# Patient Record
Sex: FEMALE | Race: WHITE | Employment: PART TIME | URBAN - NONMETROPOLITAN AREA
[De-identification: names, ages, dates, MRNs, and addresses within clinical notes are randomized per-mention and may not be internally consistent; named-entity substitution may affect disease eponyms.]

---
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
## 2020-06-26 LAB — TSH SERPL DL<=0.05 MIU/L-ACNC: 0.9 MIU/ML (ref 0.49–4.67)

## 2020-08-15 ENCOUNTER — OFFICE VISIT (OUTPATIENT)
Dept: PRIMARY CARE CLINIC | Age: 20
End: 2020-08-15
Payer: MEDICARE

## 2020-08-15 ENCOUNTER — HOSPITAL ENCOUNTER (OUTPATIENT)
Age: 20
Setting detail: SPECIMEN
Discharge: HOME | End: 2020-08-15
Payer: MEDICARE

## 2020-08-15 VITALS
WEIGHT: 251.8 LBS | HEART RATE: 89 BPM | BODY MASS INDEX: 42.99 KG/M2 | OXYGEN SATURATION: 97 % | TEMPERATURE: 96.1 F | DIASTOLIC BLOOD PRESSURE: 68 MMHG | RESPIRATION RATE: 18 BRPM | HEIGHT: 64 IN | SYSTOLIC BLOOD PRESSURE: 110 MMHG

## 2020-08-15 PROCEDURE — 4004F PT TOBACCO SCREEN RCVD TLK: CPT | Performed by: NURSE PRACTITIONER

## 2020-08-15 PROCEDURE — G8427 DOCREV CUR MEDS BY ELIG CLIN: HCPCS | Performed by: NURSE PRACTITIONER

## 2020-08-15 PROCEDURE — G8419 CALC BMI OUT NRM PARAM NOF/U: HCPCS | Performed by: NURSE PRACTITIONER

## 2020-08-15 PROCEDURE — U0003 INFECTIOUS AGENT DETECTION BY NUCLEIC ACID (DNA OR RNA); SEVERE ACUTE RESPIRATORY SYNDROME CORONAVIRUS 2 (SARS-COV-2) (CORONAVIRUS DISEASE [COVID-19]), AMPLIFIED PROBE TECHNIQUE, MAKING USE OF HIGH THROUGHPUT TECHNOLOGIES AS DESCRIBED BY CMS-2020-01-R: HCPCS

## 2020-08-15 PROCEDURE — 99213 OFFICE O/P EST LOW 20 MIN: CPT | Performed by: NURSE PRACTITIONER

## 2020-08-15 RX ORDER — ALBUTEROL SULFATE 90 UG/1
2 AEROSOL, METERED RESPIRATORY (INHALATION) EVERY 4 HOURS PRN
COMMUNITY
Start: 2020-08-08

## 2020-08-15 RX ORDER — AMOXICILLIN 875 MG/1
875 TABLET, COATED ORAL 2 TIMES DAILY
Qty: 20 TABLET | Refills: 0 | Status: SHIPPED | OUTPATIENT
Start: 2020-08-15 | End: 2020-08-25

## 2020-08-15 ASSESSMENT — PATIENT HEALTH QUESTIONNAIRE - PHQ9
SUM OF ALL RESPONSES TO PHQ QUESTIONS 1-9: 0
2. FEELING DOWN, DEPRESSED OR HOPELESS: 0
1. LITTLE INTEREST OR PLEASURE IN DOING THINGS: 0
SUM OF ALL RESPONSES TO PHQ QUESTIONS 1-9: 0
SUM OF ALL RESPONSES TO PHQ9 QUESTIONS 1 & 2: 0

## 2020-08-15 ASSESSMENT — ENCOUNTER SYMPTOMS
WHEEZING: 0
SORE THROAT: 0
RHINORRHEA: 1
GASTROINTESTINAL NEGATIVE: 1
SINUS PRESSURE: 1
COUGH: 1

## 2020-08-15 NOTE — LETTER
[address] [facility]  [address] [facility] [address]  Phone: [phone]  Fax: [phone]    CHARISMA Zayas CNP        August 15, 2020     Patient: Roberto Pablo   YOB: 2000   Date of Visit: 8/15/2020       To Whom it May Concern:    Susana Pablo was seen in my clinic on 8/15/2020. May return to work with negative Covid-19 test result and improved symptoms. Test result in 5-7 days. If you have any questions or concerns, please don't hesitate to call.     Sincerely,         CHARISMA Zayas CNP

## 2020-08-15 NOTE — PROGRESS NOTES
[facility]             [address] [facility], [address]                        Telephone [phone]             Fax [phone]  2000  CELESTE:[id number]   Date of visit:  8/15/2020    Subjective:    Susana Pablo is a 21 y.o.  female who presents to [facility] today (8/15/2020) for evaluation of:    Chief Complaint   Patient presents with    Cough     runny/stuffy nose,chest congestion,started 1 week ago HX of asthma       Cough   This is a new problem. The current episode started 1 to 4 weeks ago (X 2 weeks). The problem has been gradually worsening. The problem occurs every few minutes. The cough is non-productive. Associated symptoms include nasal congestion, postnasal drip and rhinorrhea. Pertinent negatives include no chest pain, chills, fever, headaches, rash, sore throat or wheezing. Associated symptoms comments: cough worse at night. Nothing aggravates the symptoms. She has tried a beta-agonist inhaler (albuterol inhaler used 3 times in the last week; claritin D) for the symptoms. The treatment provided moderate relief. Her past medical history is significant for asthma. She has the following problem list:  There is no problem list on file for this patient. Current medications are:  Current Outpatient Medications   Medication Sig Dispense Refill    amoxicillin (AMOXIL) 875 MG tablet Take 1 tablet by mouth 2 times daily for 10 days 20 tablet 0    albuterol sulfate HFA (VENTOLIN HFA) 108 (90 Base) MCG/ACT inhaler Inhale 2 puffs into the lungs every 4 hours as needed for Wheezing or Shortness of Breath (cough)      ondansetron (ZOFRAN) 4 MG tablet Take 1 tablet by mouth every 8 hours as needed for Nausea (Patient not taking: Reported on 8/15/2020) 20 tablet 0     No current facility-administered medications for this visit. She has No Known Allergies. .    She  reports that she has been smoking cigarettes. She has a 20.00 pack-year smoking history. She has never used smokeless tobacco.      Objective:    Vitals:    08/15/20 1500   BP: 110/68   Pulse: 89   Resp: 18   Temp: 96.1 °F (35.6 °C)   TempSrc: Tympanic   SpO2: 97%   Weight: 251 lb 12.8 oz (114.2 kg)   Height: 5' 4\" (1.626 m)     Body mass index is 43.22 kg/m². Review of Systems   Constitutional: Negative. Negative for chills and fever. HENT: Positive for congestion, postnasal drip, rhinorrhea and sinus pressure. Negative for sore throat. Respiratory: Positive for cough. Negative for wheezing. Cardiovascular: Negative. Negative for chest pain. Gastrointestinal: Negative. Skin: Negative for rash. Neurological: Negative for headaches. Physical Exam  Vitals signs and nursing note reviewed. Constitutional:       Appearance: She is well-developed. HENT:      Head: Normocephalic. Jaw: There is normal jaw occlusion. Right Ear: Tympanic membrane, ear canal and external ear normal.      Left Ear: Tympanic membrane, ear canal and external ear normal.      Nose: Congestion present. Right Turbinates: Swollen (erythema). Left Turbinates: Swollen (erythema). Right Sinus: Maxillary sinus tenderness present. No frontal sinus tenderness. Left Sinus: Maxillary sinus tenderness present. No frontal sinus tenderness. Mouth/Throat:      Lips: Pink. Mouth: Mucous membranes are moist.      Pharynx: Oropharynx is clear. Uvula midline. Eyes:      Pupils: Pupils are equal, round, and reactive to light. Neck:      Musculoskeletal: Normal range of motion and neck supple. Cardiovascular:      Rate and Rhythm: Normal rate and regular rhythm. Heart sounds: Normal heart sounds. Pulmonary:      Effort: Pulmonary effort is normal.      Breath sounds: Normal breath sounds and air entry. Lymphadenopathy:      Cervical: No cervical adenopathy.    Skin:     General: Skin is warm and dry. Neurological:      Mental Status: She is alert and oriented to person, place, and time. Psychiatric:         Behavior: Behavior normal.         Thought Content: Thought content normal.       Assessment and Plan:    No results found for this visit on 08/15/20. Diagnosis Orders   1. Maxillary sinusitis, unspecified chronicity  amoxicillin (AMOXIL) 875 MG tablet   2. Cough  Covid-19 Ambulatory   3. Upper respiratory tract infection, unspecified type  Covid-19 Ambulatory   4. Person under investigation for COVID-19  Covid-19 Ambulatory     Self quarantine until negative Covid-19 test result and symptoms improving. We will call with Covid-19 test results. Complete full course of antibiotic. Continue Claritin D daily for sinus symptoms. she was also encouraged to use tylenol for pain/fever. Increase water intake. Use cool mist humidifier at bedtime. Use nasal saline flush as needed. Good hand hygiene. she was instructed to return if there is no improvement or symptoms worsen. The use, risks, benefits, and side effects of prescribed or recommended medications were discussed. All questions were answered and the patient/caregiver voiced understanding. No orders of the defined types were placed in this encounter.         Electronically signed by CHARISMA Cruz CNP on 8/15/20 at 3:21 PM EDT

## 2020-08-15 NOTE — PATIENT INSTRUCTIONS
Patient Education        Learning About Coronavirus [phone]  Coronavirus [phone]): Overview  What is coronavirus ([id number]-[id number])? The coronavirus disease (COVID-19) is caused by a virus. It is an illness that was first found in [place], [place], in December 2019. It has since spread worldwide. The virus can cause fever, cough, and trouble breathing. In severe cases, it can cause pneumonia and make it hard to breathe without help. It can cause death. Coronaviruses are a large group of viruses. They cause the common cold. They also cause more serious illnesses like Middle East respiratory syndrome (MERS) and severe acute respiratory syndrome (SARS). COVID-19 is caused by a novel coronavirus. That means it's a new type that has not been seen in people before. This virus spreads person-to-person through droplets from coughing and sneezing. It can also spread when you are close to someone who is infected. And it can spread when you touch something that has the virus on it, such as a doorknob or a tabletop. What can you do to protect yourself from coronavirus (COVID-19)? The best way to protect yourself from getting sick is to:  · Avoid areas where there is an outbreak. · Avoid contact with people who may be infected. · Wash your hands often with soap or alcohol-based hand sanitizers. · Avoid crowds and try to stay at least 6 feet away from other people. · Wash your hands often, especially after you cough or sneeze. Use soap and water, and scrub for at least 20 seconds. If soap and water aren't available, use an alcohol-based hand . · Avoid touching your mouth, nose, and eyes. What can you do to avoid spreading the virus to others? To help avoid spreading the virus to others:  · Cover your mouth with a tissue when you cough or sneeze. Then throw the tissue in the trash. · Use a disinfectant to clean things that you touch often. · Wear a cloth face cover if you have to go to public areas.   · Stay home face and lips have a blue color. · You pass out (lose consciousness) or are very hard to wake up. Call your doctor now or seek immediate medical care if:  · You have moderate trouble breathing. (You can't speak a full sentence.)  · You are coughing up blood (more than about 1 teaspoon). · You have signs of low blood pressure. These include feeling lightheaded; being too weak to stand; and having cold, pale, clammy skin. Watch closely for changes in your health, and be sure to contact your doctor if:  · Your symptoms get worse. · You are not getting better as expected. Call before you go to the doctor's office. Follow their instructions. And wear a cloth face cover. Current as of: May 8, 2020               Content Version: 12.5  © 2006-2020 [employer] Inc[employer]. Care instructions adapted under license by [facility] ([facility]). If you have questions about a medical condition or this instruction, always ask your healthcare professional. [place] [address] any warranty or liability for your use of this information. Patient Education        Sinusitis: Care Instructions  Your Care Instructions        Sinusitis is an infection of the lining of the sinus cavities in your head. Sinusitis often follows a cold. It causes pain and pressure in your head and face. In most cases, sinusitis gets better on its own in 1 to 2 weeks. But some mild symptoms may last for several weeks. Sometimes antibiotics are needed. Follow-up care is a key part of your treatment and safety. Be sure to make and go to all appointments, and call your doctor if you are having problems. It's also a good idea to know your test results and keep a list of the medicines you take. How can you care for yourself at home? · Take an over-the-counter pain medicine, such as acetaminophen (Tylenol), ibuprofen (Advil, Motrin), or naproxen (Aleve). Read and follow all instructions on the label.   · If the doctor prescribed antibiotics, take them as directed. Do not stop taking them just because you feel better. You need to take the full course of antibiotics. · Be careful when taking over-the-counter cold or flu medicines and Tylenol at the same time. Many of these medicines have acetaminophen, which is Tylenol. Read the labels to make sure that you are not taking more than the recommended dose. Too much acetaminophen (Tylenol) can be harmful. · Breathe warm, moist air from a steamy shower, a hot bath, or a sink filled with hot water. Avoid cold, dry air. Using a humidifier in your home may help. Follow the directions for cleaning the machine. · Use saline (saltwater) nasal washes to help keep your nasal passages open and wash out mucus and bacteria. You can buy saline nose drops at a grocery store or drugstore. Or you can make your own at home by adding 1 teaspoon of salt and 1 teaspoon of baking soda to 2 cups of distilled water. If you make your own, fill a bulb syringe with the solution, insert the tip into your nostril, and squeeze gently. Jermaine Neve your nose. · Put a hot, wet towel or a warm gel pack on your face 3 or 4 times a day for 5 to 10 minutes each time. · Try a decongestant nasal spray like oxymetazoline (Afrin). Do not use it for more than 3 days in a row. Using it for more than 3 days can make your congestion worse. When should you call for help? Call your doctor now or seek immediate medical care if:  · You have new or worse swelling or redness in your face or around your eyes. · You have a new or higher fever. Watch closely for changes in your health, and be sure to contact your doctor if:  · You have new or worse facial pain. · The mucus from your nose becomes thicker (like pus) or has new blood in it. · You are not getting better as expected. Where can you learn more? Go to https://carol.[employer]. org and sign in to your [facility] account.  Enter R968 in the "[employer] box to learn more about

## 2020-08-19 LAB — SARS-COV-2, NAA: NOT DETECTED

## 2020-11-09 LAB
ALBUMIN/GLOBULIN RATIO: 1.1 G/DL
ALBUMIN: 3.5 G/DL (ref 3.5–5)
ALP BLD-CCNC: 351 UNITS/L (ref 38–126)
ALT SERPL-CCNC: 23 UNITS/L (ref 4–35)
ANION GAP SERPL CALCULATED.3IONS-SCNC: 12.8 MMOL/L
AST SERPL-CCNC: 22 UNITS/L (ref 14–36)
BASOPHILS %: 0.42 (ref 0–3)
BASOPHILS ABSOLUTE: 0.06 (ref 0–0.3)
BILIRUB SERPL-MCNC: 0.3 MG/DL (ref 0.2–1.3)
BUN BLDV-MCNC: 9 MG/DL (ref 7–17)
CALCIUM SERPL-MCNC: 8.5 MG/DL (ref 8.4–10.2)
CHLORIDE BLD-SCNC: 105 MMOL/L (ref 98–120)
CO2: 21 MMOL/L (ref 22–31)
CREAT SERPL-MCNC: 0.6 MG/DL (ref 0.5–1)
EOSINOPHILS %: 0.59 (ref 0–10)
EOSINOPHILS ABSOLUTE: 0.09 (ref 0–1.1)
FERRITIN: 7.24 NG/DL (ref 10–282)
GFR CALCULATED: > 60
GLOBULIN: 3.3 G/DL
GLUCOSE: 100 MG/DL (ref 65–105)
HCT VFR BLD CALC: 35.5 % (ref 37–47)
HEMOGLOBIN: 11.6 (ref 12–16)
IRON: 130 MG/DL (ref 37–170)
LYMPHOCYTE %: 14.82 (ref 20–51.1)
LYMPHOCYTES ABSOLUTE: 2.2 (ref 1–5.5)
MCH RBC QN AUTO: 28 PG (ref 28.5–32.5)
MCHC RBC AUTO-ENTMCNC: 32.7 G/DL (ref 32–37)
MCV RBC AUTO: 85.8 FL (ref 80–94)
MONOCYTES %: 3.92 (ref 1.7–9.3)
MONOCYTES ABSOLUTE: 0.58 (ref 0.1–1)
NEUTROPHILS %: 80.25 (ref 42.2–75.2)
NEUTROPHILS ABSOLUTE: 11.94 (ref 2–8.1)
PDW BLD-RTO: 11.9 % (ref 8.5–15.5)
PLATELET # BLD: 297 THOU/MM3 (ref 130–400)
POTASSIUM SERPL-SCNC: 3.8 MMOL/L (ref 3.6–5)
RBC: 4.13 M/UL (ref 4.2–5.4)
SODIUM BLD-SCNC: 135 MMOL/L (ref 135–145)
TOTAL IRON BINDING CAPACITY: 488 MG/DL (ref 261–497)
TOTAL PROTEIN, SERUM: 6.7 G/DL (ref 6.3–8.2)
WBC: 14.9 THOU/ML3 (ref 4.8–10.8)

## 2021-07-21 ENCOUNTER — HOSPITAL ENCOUNTER (EMERGENCY)
Age: 21
Discharge: HOME | End: 2021-07-22
Attending: EMERGENCY MEDICINE
Payer: MEDICARE

## 2021-07-21 DIAGNOSIS — R11.2 NON-INTRACTABLE VOMITING WITH NAUSEA, UNSPECIFIED VOMITING TYPE: ICD-10-CM

## 2021-07-21 DIAGNOSIS — R10.84 GENERALIZED ABDOMINAL PAIN: Primary | ICD-10-CM

## 2021-07-21 LAB
-: ABNORMAL
ABSOLUTE EOS #: 0.25 K/UL (ref 0–0.44)
ABSOLUTE IMMATURE GRANULOCYTE: 0.03 K/UL (ref 0–0.3)
ABSOLUTE LYMPH #: 1.78 K/UL (ref 1.1–3.7)
ABSOLUTE MONO #: 0.6 K/UL (ref 0.1–1.4)
ALBUMIN SERPL-MCNC: 4.2 G/DL (ref 3.5–5.2)
ALBUMIN/GLOBULIN RATIO: 1.3 (ref 1–2.5)
ALP BLD-CCNC: 112 U/L (ref 35–104)
ALT SERPL-CCNC: 21 U/L (ref 5–33)
AMORPHOUS: ABNORMAL
ANION GAP SERPL CALCULATED.3IONS-SCNC: 9 MMOL/L (ref 9–17)
AST SERPL-CCNC: 20 U/L
BACTERIA: ABNORMAL
BASOPHILS # BLD: 1 % (ref 0–2)
BASOPHILS ABSOLUTE: 0.05 K/UL (ref 0–0.2)
BILIRUB SERPL-MCNC: 0.21 MG/DL (ref 0.3–1.2)
BILIRUBIN URINE: NEGATIVE
BUN BLDV-MCNC: 9 MG/DL (ref 6–20)
BUN/CREAT BLD: 16 (ref 9–20)
CALCIUM SERPL-MCNC: 9.1 MG/DL (ref 8.6–10.4)
CASTS UA: ABNORMAL /LPF (ref 0–2)
CHLORIDE BLD-SCNC: 108 MMOL/L (ref 98–107)
CO2: 24 MMOL/L (ref 20–31)
COLOR: ABNORMAL
COMMENT UA: ABNORMAL
CREAT SERPL-MCNC: 0.55 MG/DL (ref 0.5–0.9)
CRYSTALS, UA: ABNORMAL /HPF
DIFFERENTIAL TYPE: ABNORMAL
EOSINOPHILS RELATIVE PERCENT: 3 % (ref 1–4)
EPITHELIAL CELLS UA: ABNORMAL /HPF (ref 0–5)
GFR AFRICAN AMERICAN: >60 ML/MIN
GFR NON-AFRICAN AMERICAN: >60 ML/MIN
GFR SERPL CREATININE-BSD FRML MDRD: ABNORMAL ML/MIN/{1.73_M2}
GFR SERPL CREATININE-BSD FRML MDRD: ABNORMAL ML/MIN/{1.73_M2}
GLUCOSE BLD-MCNC: 97 MG/DL (ref 70–99)
GLUCOSE URINE: NEGATIVE
HCG(URINE) PREGNANCY TEST: NEGATIVE
HCT VFR BLD CALC: 37.7 % (ref 36.3–47.1)
HEMOGLOBIN: 11.8 G/DL (ref 11.9–15.1)
IMMATURE GRANULOCYTES: 0 %
KETONES, URINE: NEGATIVE
LEUKOCYTE ESTERASE, URINE: NEGATIVE
LIPASE: 21 U/L (ref 13–60)
LYMPHOCYTES # BLD: 20 % (ref 25–45)
MCH RBC QN AUTO: 26.2 PG (ref 25.2–33.5)
MCHC RBC AUTO-ENTMCNC: 31.3 G/DL (ref 25.2–33.5)
MCV RBC AUTO: 83.8 FL (ref 82.6–102.9)
MONOCYTES # BLD: 7 % (ref 2–8)
MUCUS: ABNORMAL
NITRITE, URINE: NEGATIVE
NRBC AUTOMATED: 0 PER 100 WBC
OTHER OBSERVATIONS UA: ABNORMAL
PDW BLD-RTO: 15.1 % (ref 11.8–14.4)
PH UA: 8.5 (ref 5–6)
PLATELET # BLD: 344 K/UL (ref 138–453)
PLATELET ESTIMATE: ABNORMAL
PMV BLD AUTO: 9.3 FL (ref 8.1–13.5)
POTASSIUM SERPL-SCNC: 3.8 MMOL/L (ref 3.7–5.3)
PROTEIN UA: NEGATIVE
RBC # BLD: 4.5 M/UL (ref 3.95–5.11)
RBC # BLD: ABNORMAL 10*6/UL
RBC UA: ABNORMAL /HPF (ref 0–4)
RENAL EPITHELIAL, UA: ABNORMAL /HPF
SEG NEUTROPHILS: 69 % (ref 34–64)
SEGMENTED NEUTROPHILS ABSOLUTE COUNT: 6.09 K/UL (ref 1.5–8.1)
SODIUM BLD-SCNC: 141 MMOL/L (ref 135–144)
SPECIFIC GRAVITY UA: 1.02 (ref 1.01–1.02)
TOTAL PROTEIN: 7.4 G/DL (ref 6.4–8.3)
TRICHOMONAS: ABNORMAL
TURBIDITY: ABNORMAL
URINE HGB: NEGATIVE
UROBILINOGEN, URINE: NORMAL
WBC # BLD: 8.8 K/UL (ref 4.5–13.5)
WBC # BLD: ABNORMAL 10*3/UL
WBC UA: ABNORMAL /HPF (ref 0–4)
YEAST: ABNORMAL

## 2021-07-21 PROCEDURE — 81025 URINE PREGNANCY TEST: CPT

## 2021-07-21 PROCEDURE — 6370000000 HC RX 637 (ALT 250 FOR IP): Performed by: EMERGENCY MEDICINE

## 2021-07-21 PROCEDURE — 99284 EMERGENCY DEPT VISIT MOD MDM: CPT

## 2021-07-21 PROCEDURE — 85025 COMPLETE CBC W/AUTO DIFF WBC: CPT

## 2021-07-21 PROCEDURE — 36415 COLL VENOUS BLD VENIPUNCTURE: CPT

## 2021-07-21 PROCEDURE — 83690 ASSAY OF LIPASE: CPT

## 2021-07-21 PROCEDURE — 81001 URINALYSIS AUTO W/SCOPE: CPT

## 2021-07-21 PROCEDURE — 80053 COMPREHEN METABOLIC PANEL: CPT

## 2021-07-21 RX ORDER — ONDANSETRON 4 MG/1
4 TABLET, ORALLY DISINTEGRATING ORAL ONCE
Status: COMPLETED | OUTPATIENT
Start: 2021-07-21 | End: 2021-07-21

## 2021-07-21 RX ORDER — DICYCLOMINE HYDROCHLORIDE 10 MG/1
20 CAPSULE ORAL ONCE
Status: COMPLETED | OUTPATIENT
Start: 2021-07-21 | End: 2021-07-21

## 2021-07-21 RX ADMIN — ONDANSETRON 4 MG: 4 TABLET, ORALLY DISINTEGRATING ORAL at 23:11

## 2021-07-21 RX ADMIN — DICYCLOMINE HYDROCHLORIDE 20 MG: 10 CAPSULE ORAL at 23:11

## 2021-07-21 ASSESSMENT — PAIN SCALES - GENERAL: PAINLEVEL_OUTOF10: 7

## 2021-07-21 ASSESSMENT — PAIN DESCRIPTION - PAIN TYPE: TYPE: ACUTE PAIN

## 2021-07-21 ASSESSMENT — PAIN DESCRIPTION - LOCATION: LOCATION: ABDOMEN

## 2021-07-21 ASSESSMENT — PAIN DESCRIPTION - DESCRIPTORS: DESCRIPTORS: PRESSURE

## 2021-07-22 VITALS
HEART RATE: 93 BPM | SYSTOLIC BLOOD PRESSURE: 140 MMHG | OXYGEN SATURATION: 98 % | TEMPERATURE: 99.1 F | BODY MASS INDEX: 41.83 KG/M2 | RESPIRATION RATE: 16 BRPM | WEIGHT: 245 LBS | HEIGHT: 64 IN | DIASTOLIC BLOOD PRESSURE: 82 MMHG

## 2021-07-22 RX ORDER — ONDANSETRON 4 MG/1
4 TABLET, ORALLY DISINTEGRATING ORAL EVERY 8 HOURS PRN
Qty: 12 TABLET | Refills: 0 | Status: SHIPPED | OUTPATIENT
Start: 2021-07-22 | End: 2021-12-03

## 2021-07-22 RX ORDER — DICYCLOMINE HYDROCHLORIDE 10 MG/1
20 CAPSULE ORAL EVERY 6 HOURS PRN
Qty: 30 CAPSULE | Refills: 0 | Status: SHIPPED | OUTPATIENT
Start: 2021-07-22 | End: 2022-10-12

## 2021-07-22 ASSESSMENT — ENCOUNTER SYMPTOMS
ABDOMINAL PAIN: 1
NAUSEA: 1
SHORTNESS OF BREATH: 0
COUGH: 0
VOMITING: 1
BACK PAIN: 0

## 2021-07-22 NOTE — ED PROVIDER NOTES
[address] [facility]  [address]  Phone: [phone]  eMERGENCY dEPARTMENT eNCOUnter      Pt Name: Branden Lane  MRN: [id number]  [place] 2000  Date of evaluation: 7/22/21      CHIEF COMPLAINT     Chief Complaint   Patient presents with    Abdominal Pain     x2-3 days, n/v, x1 episode of emesis today. Pt reports she had hx of gb removed and has IBS. HISTORY OF PRESENT ILLNESS    Susana Pablo is a 24 y.o. female who presents today with complaints of abdominal pain that has been going on for 1 week is what she reports to me. Is waxing and waning. She states that it feels like something is stretching and twisting inside her abdomen. Has not noticed any relationship to food. She rates it as a moderate pain. She had one episode of emesis today. No diarrhea or constipation. Has not noticed any blood in the stool. Prior history of cholecystectomy. She has also had 2 previous C-sections. No fevers. No cough cold or congestion. Is otherwise able to tolerate fluids. No history of diabetes. REVIEW OF SYSTEMS     Review of Systems   Constitutional: Negative for fever. HENT: Negative for congestion. Respiratory: Negative for cough and shortness of breath. Cardiovascular: Negative for chest pain. Gastrointestinal: Positive for abdominal pain, nausea and vomiting. Genitourinary: Negative for dysuria. Musculoskeletal: Negative for back pain. Skin: Negative for rash. Neurological: Negative for syncope. All other systems reviewed and are negative. PAST MEDICAL HISTORY    has no past medical history on file. SURGICAL HISTORY      has a past surgical history that includes Cholecystectomy and Tympanostomy tube placement.     CURRENT MEDICATIONS       Discharge Medication List as of 7/22/2021 12:13 AM      CONTINUE these medications which have NOT CHANGED    Details   albuterol sulfate HFA (VENTOLIN HFA) 108 (90 Base) MCG/ACT inhaler Inhale 2 puffs into the lungs every 4 hours as needed for Wheezing or Shortness of Breath (cough)Historical Med             ALLERGIES     has No Known Allergies. FAMILY HISTORY     has no family status information on file. family history is not on file. SOCIAL HISTORY      reports that she has been smoking cigarettes. She has a 20.00 pack-year smoking history. She has never used smokeless tobacco. She reports current drug use. Frequency: 1.00 time per week. Drug: Marijuana. She reports that she does not drink alcohol. PHYSICAL EXAM     INITIAL VITALS:  height is 5' 4\" (1.626 m) and weight is 245 lb (111.1 kg). Her tympanic temperature is 99.1 °F (37.3 °C). Her blood pressure is 140/82 (abnormal) and her pulse is 93. Her respiration is 16 and oxygen saturation is 98%. Physical Exam  Vitals reviewed. Constitutional:       Appearance: She is well-developed. HENT:      Head: Normocephalic and atraumatic. Eyes:      Extraocular Movements: Extraocular movements intact. Pupils: Pupils are equal, round, and reactive to light. Cardiovascular:      Rate and Rhythm: Normal rate and regular rhythm. Heart sounds: Normal heart sounds. No murmur heard. Pulmonary:      Effort: Pulmonary effort is normal. No respiratory distress. Breath sounds: Normal breath sounds. Abdominal:      Palpations: Abdomen is soft. Tenderness: There is abdominal tenderness in the periumbilical area. There is no guarding or rebound. Negative signs include McBurney's sign. Skin:     General: Skin is warm and dry. Capillary Refill: Capillary refill takes less than 2 seconds. Findings: No rash. Neurological:      General: No focal deficit present. Mental Status: She is alert and oriented to person, place, and time. Cranial Nerves: No cranial nerve deficit. Motor: No weakness.    Psychiatric:         Mood and Affect: Mood normal.         Behavior: Behavior normal.       DIFFERENTIAL DIAGNOSIS / MDM / EMERGENCY DEPARTMENT COURSE:     Plan for oral antiemetics as well as abdominal labs. Patient's abdominal exam with tenderness but no rebound rigidity or guarding. Labs did not have clinically significant abnormalities. Risks and benefits of imaging were discussed. Patient was able to tolerate fluids had had no further emesis and is feeling improved. Will give a note for work. Indicated to patient that early on in an acute process and ED visit may be falsely leave reassuring and she was educated on the warning signs which read turn to the emergency department. She expressed understanding was agreeable with the plan    I have reviewed the disposition diagnosis with the patient and or their family/guardian. I have answered their questions and givendischarge instructions. They voiced understanding of these instructions and did not have any further questions or complaints. DIAGNOSTIC RESULTS     EKG: All EKG's are interpreted by the Emergency Department Physician who either signs or Co-signs this chart inthe absence of a cardiologist.        RADIOLOGY:   I directly visualized the following plain film images and reviewed the radiologistinterpretations of radiologic studies:    No orders to display        No results found.       LABS:  Results for orders placed or performed during the hospital encounter of 07/21/21   Urinalysis Reflex to Culture    Specimen: Urine, clean catch   Result Value Ref Range    Color, UA NOT REPORTED YELLOW    Turbidity UA NOT REPORTED CLEAR    Glucose, Ur NEGATIVE NEGATIVE    Bilirubin Urine NEGATIVE NEGATIVE    Ketones, Urine NEGATIVE NEGATIVE    Specific Gravity, UA 1.020 1.010 - 1.025    Urine Hgb NEGATIVE NEGATIVE    pH, UA 8.5 (H) 5.0 - 6.0    Protein, UA NEGATIVE NEGATIVE    Urobilinogen, Urine Normal Normal    Nitrite, Urine NEGATIVE NEGATIVE    Leukocyte Esterase, Urine NEGATIVE NEGATIVE    Urinalysis Comments NOT REPORTED    Pregnancy, Urine   Result Value Ref Range Crystals, UA NOT REPORTED None /HPF    Epithelial Cells UA 10 TO 25 0 - 5 /HPF    Renal Epithelial, UA NOT REPORTED 0 /HPF    Bacteria, UA 2+ (A) None    Mucus, UA 2+ (A) None    Trichomonas, UA NOT REPORTED None    Amorphous, UA 2+ (A) None    Other Observations UA NOT REPORTED NOT REQ. Yeast, UA NOT REPORTED None       EMERGENCY DEPARTMENT COURSE:   Vitals:    Vitals:    07/21/21 2157 07/22/21 0022   BP: 113/67 (!) 140/82   Pulse: 93 93   Resp: 16    Temp: 99.1 °F (37.3 °C)    TempSrc: Tympanic    SpO2: 97% 98%   Weight: 245 lb (111.1 kg)    Height: 5' 4\" (1.626 m)      -------------------------  BP: (!) 140/82, Temp: 99.1 °F (37.3 °C), Pulse: 93, Resp: 16      CONSULTS:  None    PROCEDURES:  None    FINAL IMPRESSION      1. Generalized abdominal pain    2.  Non-intractable vomiting with nausea, unspecified vomiting type          DISPOSITION/PLAN   DISPOSITION Decision To Discharge 07/22/2021 12:06:45 AM      PATIENT REFERRED TO:  Edil Santos MD  [address] [id number] [phone]    In 2 days        DISCHARGE MEDICATIONS:  Discharge Medication List as of 7/22/2021 12:13 AM      START taking these medications    Details   ondansetron (ZOFRAN ODT) 4 MG disintegrating tablet Take 1 tablet by mouth every 8 hours as needed for Nausea, Disp-12 tablet, R-0Normal      dicyclomine (BENTYL) 10 MG capsule Take 2 capsules by mouth every 6 hours as needed (cramps), Disp-30 capsule, R-0Normal             (Please note that portions of this note were completed with avoice recognition program.  Efforts were made to edit the dictations but occasionally words are mis-transcribed.)    Warner Haji MD, [address] [facility]  Attending Emergency Medicine Physician        Warner Haji MD  07/22/21 5932

## 2021-07-31 ENCOUNTER — HOSPITAL ENCOUNTER (EMERGENCY)
Age: 21
Discharge: HOME | End: 2021-08-01
Attending: EMERGENCY MEDICINE
Payer: MEDICARE

## 2021-07-31 DIAGNOSIS — N93.9 VAGINAL BLEEDING: Primary | ICD-10-CM

## 2021-07-31 LAB
ABSOLUTE EOS #: 0.39 K/UL (ref 0–0.44)
ABSOLUTE IMMATURE GRANULOCYTE: <0.03 K/UL (ref 0–0.3)
ABSOLUTE LYMPH #: 2.69 K/UL (ref 1.1–3.7)
ABSOLUTE MONO #: 0.51 K/UL (ref 0.1–1.4)
BASOPHILS # BLD: 1 % (ref 0–2)
BASOPHILS ABSOLUTE: 0.06 K/UL (ref 0–0.2)
DIFFERENTIAL TYPE: ABNORMAL
EOSINOPHILS RELATIVE PERCENT: 4 % (ref 1–4)
HCT VFR BLD CALC: 35 % (ref 36.3–47.1)
HEMOGLOBIN: 11 G/DL (ref 11.9–15.1)
IMMATURE GRANULOCYTES: 0 %
LYMPHOCYTES # BLD: 29 % (ref 25–45)
MCH RBC QN AUTO: 26.6 PG (ref 25.2–33.5)
MCHC RBC AUTO-ENTMCNC: 31.4 G/DL (ref 25.2–33.5)
MCV RBC AUTO: 84.5 FL (ref 82.6–102.9)
MONOCYTES # BLD: 6 % (ref 2–8)
NRBC AUTOMATED: 0 PER 100 WBC
PDW BLD-RTO: 15 % (ref 11.8–14.4)
PLATELET # BLD: 363 K/UL (ref 138–453)
PLATELET ESTIMATE: ABNORMAL
PMV BLD AUTO: 8.9 FL (ref 8.1–13.5)
RBC # BLD: 4.14 M/UL (ref 3.95–5.11)
RBC # BLD: ABNORMAL 10*6/UL
SEG NEUTROPHILS: 60 % (ref 34–64)
SEGMENTED NEUTROPHILS ABSOLUTE COUNT: 5.47 K/UL (ref 1.5–8.1)
WBC # BLD: 9.1 K/UL (ref 4.5–13.5)
WBC # BLD: ABNORMAL 10*3/UL

## 2021-07-31 PROCEDURE — 84703 CHORIONIC GONADOTROPIN ASSAY: CPT

## 2021-07-31 PROCEDURE — 85025 COMPLETE CBC W/AUTO DIFF WBC: CPT

## 2021-07-31 PROCEDURE — 99284 EMERGENCY DEPT VISIT MOD MDM: CPT

## 2021-07-31 PROCEDURE — 81001 URINALYSIS AUTO W/SCOPE: CPT

## 2021-07-31 PROCEDURE — 80048 BASIC METABOLIC PNL TOTAL CA: CPT

## 2021-07-31 PROCEDURE — 36415 COLL VENOUS BLD VENIPUNCTURE: CPT

## 2021-07-31 ASSESSMENT — PAIN DESCRIPTION - PROGRESSION: CLINICAL_PROGRESSION: NOT CHANGED

## 2021-07-31 ASSESSMENT — PAIN DESCRIPTION - ONSET: ONSET: ON-GOING

## 2021-07-31 ASSESSMENT — PAIN DESCRIPTION - DESCRIPTORS: DESCRIPTORS: ACHING;CRAMPING

## 2021-07-31 ASSESSMENT — PAIN DESCRIPTION - FREQUENCY: FREQUENCY: CONTINUOUS

## 2021-07-31 ASSESSMENT — PAIN DESCRIPTION - ORIENTATION: ORIENTATION: RIGHT;LEFT;ANTERIOR;POSTERIOR

## 2021-07-31 ASSESSMENT — PAIN DESCRIPTION - LOCATION: LOCATION: ABDOMEN;BACK

## 2021-07-31 ASSESSMENT — PAIN DESCRIPTION - PAIN TYPE: TYPE: ACUTE PAIN

## 2021-07-31 ASSESSMENT — PAIN SCALES - GENERAL: PAINLEVEL_OUTOF10: 8

## 2021-08-01 VITALS
HEART RATE: 62 BPM | SYSTOLIC BLOOD PRESSURE: 109 MMHG | BODY MASS INDEX: 42.68 KG/M2 | WEIGHT: 250 LBS | RESPIRATION RATE: 16 BRPM | HEIGHT: 64 IN | OXYGEN SATURATION: 99 % | DIASTOLIC BLOOD PRESSURE: 58 MMHG | TEMPERATURE: 98.1 F

## 2021-08-01 LAB
-: ABNORMAL
AMORPHOUS: ABNORMAL
ANION GAP SERPL CALCULATED.3IONS-SCNC: 7 MMOL/L (ref 9–17)
BACTERIA: ABNORMAL
BILIRUBIN URINE: ABNORMAL
BUN BLDV-MCNC: 9 MG/DL (ref 6–20)
BUN/CREAT BLD: 12 (ref 9–20)
CALCIUM SERPL-MCNC: 9 MG/DL (ref 8.6–10.4)
CASTS UA: ABNORMAL /LPF (ref 0–2)
CHLORIDE BLD-SCNC: 110 MMOL/L (ref 98–107)
CO2: 29 MMOL/L (ref 20–31)
COLOR: ABNORMAL
COMMENT UA: ABNORMAL
CREAT SERPL-MCNC: 0.73 MG/DL (ref 0.5–0.9)
CRYSTALS, UA: ABNORMAL /HPF
EPITHELIAL CELLS UA: ABNORMAL /HPF (ref 0–5)
GFR AFRICAN AMERICAN: >60 ML/MIN
GFR NON-AFRICAN AMERICAN: >60 ML/MIN
GFR SERPL CREATININE-BSD FRML MDRD: ABNORMAL ML/MIN/{1.73_M2}
GFR SERPL CREATININE-BSD FRML MDRD: ABNORMAL ML/MIN/{1.73_M2}
GLUCOSE BLD-MCNC: 96 MG/DL (ref 70–99)
GLUCOSE URINE: NEGATIVE
HCG QUALITATIVE: NEGATIVE
KETONES, URINE: ABNORMAL
LEUKOCYTE ESTERASE, URINE: NEGATIVE
MUCUS: ABNORMAL
NITRITE, URINE: NEGATIVE
OTHER OBSERVATIONS UA: ABNORMAL
PH UA: 7.5 (ref 5–6)
POTASSIUM SERPL-SCNC: 3.7 MMOL/L (ref 3.7–5.3)
PROTEIN UA: ABNORMAL
RBC UA: >100 /HPF (ref 0–4)
RENAL EPITHELIAL, UA: ABNORMAL /HPF
SODIUM BLD-SCNC: 146 MMOL/L (ref 135–144)
SPECIFIC GRAVITY UA: 1.02 (ref 1.01–1.02)
TRICHOMONAS: ABNORMAL
TURBIDITY: ABNORMAL
URINE HGB: ABNORMAL
UROBILINOGEN, URINE: NORMAL
WBC UA: ABNORMAL /HPF (ref 0–4)
YEAST: ABNORMAL

## 2021-08-01 NOTE — ED TRIAGE NOTES
Patient states she started her menstrual period today. Patient states she has irregular periods. Patient states the cramping is worse than normal and radiates to her back. Patient states the bleeding is heavier than normal and that she has small clots. Patient denies any tissue like discharge.

## 2021-08-01 NOTE — ED PROVIDER NOTES
eMERGENCY dEPARTMENT eNCOUnter      Pt Name: Manuel Lane  MRN: [id number]  [place] [address]  Date of evaluation: 7/31/2021      CHIEF COMPLAINT       Chief Complaint   Patient presents with    Vaginal Bleeding         HISTORY OF PRESENT ILLNESS    Susana Pablo is a 24 y.o. female who presents with vaginal bleeding. Patient states she started her period today last was about 3 months ago she states she normally is very irregular says that seems to have been heavier she was passing some clots she denies any significant abdominal pain of though she does complain a little bit of lower abdominal cramping no frequency urgency no chest pain shortness of breath        REVIEW OF SYSTEMS       Review of systems are all reviewed and negative except stated above in HPI    [address]    has a past medical history of Asthma and Bleeding disorder ([facility] [address] SURGICAL HISTORY      has a past surgical history that includes Cholecystectomy and Tympanostomy tube placement. CURRENT MEDICATIONS       Discharge Medication List as of 8/1/2021 12:11 AM      CONTINUE these medications which have NOT CHANGED    Details   ondansetron (ZOFRAN ODT) 4 MG disintegrating tablet Take 1 tablet by mouth every 8 hours as needed for Nausea, Disp-12 tablet, R-0Normal      dicyclomine (BENTYL) 10 MG capsule Take 2 capsules by mouth every 6 hours as needed (cramps), Disp-30 capsule, R-0Normal      albuterol sulfate HFA (VENTOLIN HFA) 108 (90 Base) MCG/ACT inhaler Inhale 2 puffs into the lungs every 4 hours as needed for Wheezing or Shortness of Breath (cough)Historical Med             ALLERGIES     has No Known Allergies. FAMILY HISTORY     has no family status information on file. family history is not on file. SOCIAL HISTORY      reports that she has been smoking cigarettes. She has a 2.50 pack-year smoking history. She has never used smokeless tobacco. She reports current alcohol use. She reports current drug use.  Frequency: 1.00 time per week. Drug: Marijuana. PHYSICAL EXAM     INITIAL VITALS:  height is 5' 4\" (1.626 m) and weight is 250 lb (113.4 kg). Her tympanic temperature is 98.1 °F (36.7 °C). Her blood pressure is 109/58 (abnormal) and her pulse is 62. Her respiration is 16 and oxygen saturation is 99%.       General: Patient alert nontoxic-appearing morbidly obese female in no apparent distress  HEENT: Head is atraumatic  Respiratory: Lung sounds are clear bilateral  Cardiac: Heart is regular rate and rhythm  GI: Abdomen is obese soft nontender bowel sounds active throughout    DIFFERENTIAL DIAGNOSIS/ MDM:     Vaginal bleeding pregnancy    DIAGNOSTIC RESULTS     EKG: All EKG's are interpreted by the Emergency Department Physician who either signs or Co-signs this chart in the absence of a cardiologist.        RADIOLOGY:   I directly visualized the following  images and reviewed the radiologist interpretations:  No orders to display         LABS:  Labs Reviewed   CBC WITH AUTO DIFFERENTIAL - Abnormal; Notable for the following components:       Result Value    Hemoglobin 11.0 (*)     Hematocrit 35.0 (*)     RDW 15.0 (*)     All other components within normal limits   BASIC METABOLIC PANEL - Abnormal; Notable for the following components:    Sodium 146 (*)     Chloride 110 (*)     Anion Gap 7 (*)     All other components within normal limits   URINALYSIS - Abnormal; Notable for the following components:    Bilirubin Urine 1+ (*)     Ketones, Urine TRACE (*)     Urine Hgb 3+ (*)     pH, UA 7.5 (*)     Protein, UA 1+ (*)     All other components within normal limits   MICROSCOPIC URINALYSIS - Abnormal; Notable for the following components:    Bacteria, UA 1+ (*)     Amorphous, UA 1+ (*)     All other components within normal limits   HCG, SERUM, QUALITATIVE         EMERGENCY DEPARTMENT COURSE:   Vitals:    Vitals:    07/31/21 2238 08/01/21 0026   BP: 133/64 (!) 109/58   Pulse: 74 62   Resp: 18 16   Temp: 98.1 °F (36.7 °C) TempSrc: Tympanic    SpO2: 99%    Weight: 250 lb (113.4 kg)    Height: 5' 4\" (1.626 m)      -------------------------  BP: (!) 109/58, Temp: 98.1 °F (36.7 °C), Pulse: 62, Resp: 16    No orders of the defined types were placed in this encounter. Re-evaluation Notes    Labs showed some mild electrolyte abnormalities and a mild anemia vital signs are stable she has a nonsurgical abdomen at this time she will be discharged with follow-up with OB/GYN return if worse    CRITICAL CARE:   None      CONSULTS:      PROCEDURES:  None    FINAL IMPRESSION      1. Vaginal bleeding          DISPOSITION/PLAN   DISPOSITION Decision To Discharge 08/01/2021 12:10:52 AM      Condition on Disposition    Stable    PATIENT REFERRED TO:  Adam Izaguirre MD  [phone]    In 2 days        DISCHARGE MEDICATIONS:  Discharge Medication List as of 8/1/2021 12:11 AM          (Please note that portions of this note were completed with a voice recognition program.  Efforts were made to edit the dictations but occasionally words are mis-transcribed.)    Hong Wells MD,, MD, F.A.C.E.P.   Attending Emergency Physician        Hong Wells MD  08/01/21 4466

## 2021-10-18 LAB
C TRACH DNA CVX QL NAA+PROBE: NEGATIVE
GC DNA: NEGATIVE

## 2021-12-03 ENCOUNTER — APPOINTMENT (OUTPATIENT)
Dept: CT IMAGING | Age: 21
End: 2021-12-03
Payer: MEDICARE

## 2021-12-03 ENCOUNTER — HOSPITAL ENCOUNTER (EMERGENCY)
Age: 21
Discharge: HOME | End: 2021-12-03
Attending: EMERGENCY MEDICINE
Payer: MEDICARE

## 2021-12-03 VITALS
HEART RATE: 66 BPM | RESPIRATION RATE: 14 BRPM | SYSTOLIC BLOOD PRESSURE: 105 MMHG | DIASTOLIC BLOOD PRESSURE: 67 MMHG | OXYGEN SATURATION: 99 % | TEMPERATURE: 98.9 F

## 2021-12-03 DIAGNOSIS — R11.2 NON-INTRACTABLE VOMITING WITH NAUSEA, UNSPECIFIED VOMITING TYPE: Primary | ICD-10-CM

## 2021-12-03 DIAGNOSIS — R10.9 ABDOMINAL PAIN, UNSPECIFIED ABDOMINAL LOCATION: ICD-10-CM

## 2021-12-03 LAB
-: ABNORMAL
ABSOLUTE EOS #: 0.98 K/UL (ref 0–0.44)
ABSOLUTE IMMATURE GRANULOCYTE: 0.04 K/UL (ref 0–0.3)
ABSOLUTE LYMPH #: 2.09 K/UL (ref 1.1–3.7)
ABSOLUTE MONO #: 0.61 K/UL (ref 0.1–1.4)
ALBUMIN SERPL-MCNC: 4.2 G/DL (ref 3.5–5.2)
ALBUMIN/GLOBULIN RATIO: 1.5 (ref 1–2.5)
ALP BLD-CCNC: 113 U/L (ref 35–104)
ALT SERPL-CCNC: 27 U/L (ref 5–33)
AMORPHOUS: ABNORMAL
AMYLASE: 65 U/L (ref 28–100)
ANION GAP SERPL CALCULATED.3IONS-SCNC: 9 MMOL/L (ref 9–17)
AST SERPL-CCNC: 17 U/L
BACTERIA: ABNORMAL
BASOPHILS # BLD: 1 % (ref 0–2)
BASOPHILS ABSOLUTE: 0.12 K/UL (ref 0–0.2)
BILIRUB SERPL-MCNC: 0.11 MG/DL (ref 0.3–1.2)
BILIRUBIN DIRECT: <0.08 MG/DL
BILIRUBIN URINE: NEGATIVE
BILIRUBIN, INDIRECT: ABNORMAL MG/DL (ref 0–1)
BUN BLDV-MCNC: 8 MG/DL (ref 6–20)
BUN/CREAT BLD: 15 (ref 9–20)
CALCIUM SERPL-MCNC: 9.2 MG/DL (ref 8.6–10.4)
CASTS UA: ABNORMAL /LPF (ref 0–2)
CHLORIDE BLD-SCNC: 105 MMOL/L (ref 98–107)
CO2: 25 MMOL/L (ref 20–31)
COLOR: ABNORMAL
COMMENT UA: ABNORMAL
CREAT SERPL-MCNC: 0.54 MG/DL (ref 0.5–0.9)
CRYSTALS, UA: ABNORMAL /HPF
DIFFERENTIAL TYPE: ABNORMAL
EOSINOPHILS RELATIVE PERCENT: 9 % (ref 1–4)
EPITHELIAL CELLS UA: ABNORMAL /HPF (ref 0–5)
GFR AFRICAN AMERICAN: >60 ML/MIN
GFR NON-AFRICAN AMERICAN: >60 ML/MIN
GFR SERPL CREATININE-BSD FRML MDRD: NORMAL ML/MIN/{1.73_M2}
GFR SERPL CREATININE-BSD FRML MDRD: NORMAL ML/MIN/{1.73_M2}
GLOBULIN: 2.8 G/DL (ref 1.5–3.8)
GLUCOSE BLD-MCNC: 85 MG/DL (ref 70–99)
GLUCOSE URINE: NEGATIVE
HCG(URINE) PREGNANCY TEST: NEGATIVE
HCT VFR BLD CALC: 36.3 % (ref 36.3–47.1)
HEMOGLOBIN: 11.3 G/DL (ref 11.9–15.1)
IMMATURE GRANULOCYTES: 0 %
KETONES, URINE: NEGATIVE
LEUKOCYTE ESTERASE, URINE: NEGATIVE
LIPASE: 27 U/L (ref 13–60)
LYMPHOCYTES # BLD: 20 % (ref 25–45)
MCH RBC QN AUTO: 27 PG (ref 25.2–33.5)
MCHC RBC AUTO-ENTMCNC: 31.1 G/DL (ref 25.2–33.5)
MCV RBC AUTO: 86.8 FL (ref 82.6–102.9)
MONOCYTES # BLD: 6 % (ref 2–8)
MUCUS: ABNORMAL
NITRITE, URINE: NEGATIVE
NRBC AUTOMATED: 0 PER 100 WBC
OTHER OBSERVATIONS UA: ABNORMAL
PDW BLD-RTO: 15.2 % (ref 11.8–14.4)
PH UA: 8 (ref 5–6)
PLATELET # BLD: 334 K/UL (ref 138–453)
PLATELET ESTIMATE: ABNORMAL
PMV BLD AUTO: 9.5 FL (ref 8.1–13.5)
POTASSIUM SERPL-SCNC: 3.8 MMOL/L (ref 3.7–5.3)
PROTEIN UA: NEGATIVE
RBC # BLD: 4.18 M/UL (ref 3.95–5.11)
RBC # BLD: ABNORMAL 10*6/UL
RBC UA: ABNORMAL /HPF (ref 0–4)
RENAL EPITHELIAL, UA: ABNORMAL /HPF
SEG NEUTROPHILS: 64 % (ref 34–64)
SEGMENTED NEUTROPHILS ABSOLUTE COUNT: 6.66 K/UL (ref 1.5–8.1)
SODIUM BLD-SCNC: 139 MMOL/L (ref 135–144)
SPECIFIC GRAVITY UA: 1.02 (ref 1.01–1.02)
TOTAL PROTEIN: 7 G/DL (ref 6.4–8.3)
TRICHOMONAS: ABNORMAL
TURBIDITY: ABNORMAL
URINE HGB: NEGATIVE
UROBILINOGEN, URINE: NORMAL
WBC # BLD: 10.5 K/UL (ref 4.5–13.5)
WBC # BLD: ABNORMAL 10*3/UL
WBC UA: ABNORMAL /HPF (ref 0–4)
YEAST: ABNORMAL

## 2021-12-03 PROCEDURE — 6360000004 HC RX CONTRAST MEDICATION: Performed by: EMERGENCY MEDICINE

## 2021-12-03 PROCEDURE — 81025 URINE PREGNANCY TEST: CPT

## 2021-12-03 PROCEDURE — 83690 ASSAY OF LIPASE: CPT

## 2021-12-03 PROCEDURE — 80076 HEPATIC FUNCTION PANEL: CPT

## 2021-12-03 PROCEDURE — 6360000002 HC RX W HCPCS: Performed by: EMERGENCY MEDICINE

## 2021-12-03 PROCEDURE — 99284 EMERGENCY DEPT VISIT MOD MDM: CPT

## 2021-12-03 PROCEDURE — 85025 COMPLETE CBC W/AUTO DIFF WBC: CPT

## 2021-12-03 PROCEDURE — 96375 TX/PRO/DX INJ NEW DRUG ADDON: CPT

## 2021-12-03 PROCEDURE — 80048 BASIC METABOLIC PNL TOTAL CA: CPT

## 2021-12-03 PROCEDURE — 81001 URINALYSIS AUTO W/SCOPE: CPT

## 2021-12-03 PROCEDURE — 2709999900 CT ABDOMEN PELVIS W IV CONTRAST

## 2021-12-03 PROCEDURE — 96374 THER/PROPH/DIAG INJ IV PUSH: CPT

## 2021-12-03 PROCEDURE — 82150 ASSAY OF AMYLASE: CPT

## 2021-12-03 PROCEDURE — 2580000003 HC RX 258: Performed by: EMERGENCY MEDICINE

## 2021-12-03 RX ORDER — MORPHINE SULFATE 4 MG/ML
4 INJECTION, SOLUTION INTRAMUSCULAR; INTRAVENOUS ONCE
Status: COMPLETED | OUTPATIENT
Start: 2021-12-03 | End: 2021-12-03

## 2021-12-03 RX ORDER — 0.9 % SODIUM CHLORIDE 0.9 %
1000 INTRAVENOUS SOLUTION INTRAVENOUS ONCE
Status: COMPLETED | OUTPATIENT
Start: 2021-12-03 | End: 2021-12-03

## 2021-12-03 RX ORDER — ONDANSETRON 4 MG/1
4 TABLET, ORALLY DISINTEGRATING ORAL EVERY 8 HOURS PRN
Qty: 20 TABLET | Refills: 0 | Status: SHIPPED | OUTPATIENT
Start: 2021-12-03 | End: 2022-10-12

## 2021-12-03 RX ORDER — ONDANSETRON 2 MG/ML
4 INJECTION INTRAMUSCULAR; INTRAVENOUS ONCE
Status: COMPLETED | OUTPATIENT
Start: 2021-12-03 | End: 2021-12-03

## 2021-12-03 RX ADMIN — MORPHINE SULFATE 4 MG: 4 INJECTION, SOLUTION INTRAMUSCULAR; INTRAVENOUS at 17:18

## 2021-12-03 RX ADMIN — SODIUM CHLORIDE 1000 ML: 9 INJECTION, SOLUTION INTRAVENOUS at 18:09

## 2021-12-03 RX ADMIN — ONDANSETRON 4 MG: 2 INJECTION INTRAMUSCULAR; INTRAVENOUS at 16:15

## 2021-12-03 RX ADMIN — IOPAMIDOL 100 ML: 755 INJECTION, SOLUTION INTRAVENOUS at 17:57

## 2021-12-03 RX ADMIN — SODIUM CHLORIDE 1000 ML: 9 INJECTION, SOLUTION INTRAVENOUS at 16:14

## 2021-12-03 ASSESSMENT — PAIN DESCRIPTION - DESCRIPTORS: DESCRIPTORS: CRAMPING

## 2021-12-03 ASSESSMENT — PAIN DESCRIPTION - PAIN TYPE: TYPE: ACUTE PAIN

## 2021-12-03 ASSESSMENT — PAIN SCALES - GENERAL
PAINLEVEL_OUTOF10: 8
PAINLEVEL_OUTOF10: 8
PAINLEVEL_OUTOF10: 7

## 2021-12-03 ASSESSMENT — PAIN DESCRIPTION - LOCATION: LOCATION: ABDOMEN

## 2021-12-03 NOTE — ED PROVIDER NOTES
[address] [facility] ED  [facility]  Phone: [phone]  [place]      Pt Name: Nika Batch  MRN: [id number]  [place] [address]  Date of evaluation: 12/3/2021    CHIEF COMPLAINT       Chief Complaint   Patient presents with    Nausea       HISTORY OF PRESENT ILLNESS    Susana Pablo is a 24 y.o. female who presents to the emergency room complaining of nausea for the past 24 to 48 hours. Multiple episodes of vomiting cannot keep anything down. Denies any fevers or chills she admits to some lower abdominal cramping in particular suprapubic. Admits to urinary frequency with some dysuria no discharge. Pain radiates across her abdomen and up to her right upper quadrant. She has had her gallbladder removed. Admits to diarrhea no constipation. No blood in her stool. No other complaints. Nothing really makes her symptoms better or worse. REVIEW OF SYSTEMS       Constitutional: No fevers or chills   HEENT: No sore throat, rhinorrhea, or earache   Eyes: No blurry vision or double vision no drainage   Cardiovascular: No chest pain or tachycardia   Respiratory: No wheezing or shortness of breath no cough   Gastrointestinal: Positive nausea vomiting diarrhea no constipation positive abdominal cramping  : No hematuria positive dysuria and frequency  Musculoskeletal: No extremity swelling or pain   Skin: No rash   Neurological: No focal neurologic complaints, paresthesias, weakness, or headache     PAST MEDICAL HISTORY    has a past medical history of Asthma and Bleeding disorder ([address] SURGICAL HISTORY      has a past surgical history that includes Cholecystectomy and Tympanostomy tube placement.     CURRENT MEDICATIONS       Previous Medications    ALBUTEROL SULFATE HFA (VENTOLIN HFA) 108 (90 BASE) MCG/ACT INHALER    Inhale 2 puffs into the lungs every 4 hours as needed for Wheezing or Shortness of Breath (cough)    DICYCLOMINE (BENTYL) 10 MG CAPSULE    Take 2 capsules by mouth every 6 hours as needed (cramps)       ALLERGIES     has No Known Allergies. FAMILY HISTORY     has no family status information on file. family history is not on file. SOCIAL HISTORY      reports that she has been smoking cigarettes. She has a 2.50 pack-year smoking history. She has never used smokeless tobacco. She reports current alcohol use. She reports current drug use. Frequency: 1.00 time per week. Drug: Marijuana Simmie Shahzad). PHYSICAL EXAM       ED Triage Vitals [12/03/21 1547]   BP Temp Temp Source Pulse Resp SpO2 Height Weight   -- 98.9 °F (37.2 °C) Tympanic 76 16 98 % -- --       Constitutional: Alert, oriented x3, nontoxic, answering questions appropriately, acting properly for age, in no acute distress   HEENT: Extraocular muscles intact, mucus membranes dry no posterior pharyngeal erythema or exudates, Pupils equal, round, reactive to light,   Neck: Trachea midline   Cardiovascular: Regular rhythm and rate no murmurs   Respiratory: Clear to auscultation bilaterally no wheezes, rhonchi, rales, no respiratory distress no tachypnea no retractions no hypoxia  Gastrointestinal: Soft, nontender, nondistended, positive bowel sounds. No rebound, rigidity, or guarding. No right or left lower quadrant tenderness to deep palpation  Musculoskeletal: No extremity pain or swelling   Neurologic: Moving all 4 extremities without difficulty there are no gross focal neurologic deficits   Skin: Warm and dry       DIFFERENTIAL DIAGNOSIS/ MDM:     IV fluids and labs. Zofran.     DIAGNOSTIC RESULTS     EKG: All EKG's are interpreted by the Emergency Department Physician who either signs or Co-signs this chart in the absence of a cardiologist.        Not indicated unless otherwise documented above    LABS:  Results for orders placed or performed during the hospital encounter of 12/03/21   CBC Auto Differential   Result Value Ref Range    WBC 10.5 4.5 - 13.5 k/uL    RBC 4.18 3.95 - 5.11 m/uL Hemoglobin 11.3 (L) 11.9 - 15.1 g/dL    Hematocrit 36.3 36.3 - 47.1 %    MCV 86.8 82.6 - 102.9 fL    MCH 27.0 25.2 - 33.5 pg    MCHC 31.1 25.2 - 33.5 g/dL    RDW 15.2 (H) 11.8 - 14.4 %    Platelets 294 336 - 047 k/uL    MPV 9.5 8.1 - 13.5 fL    NRBC Automated 0.0 0.0 per 100 WBC    Differential Type NOT REPORTED     Seg Neutrophils 64 34 - 64 %    Lymphocytes 20 (L) 25 - 45 %    Monocytes 6 2 - 8 %    Eosinophils % 9 (H) 1 - 4 %    Basophils 1 0 - 2 %    Immature Granulocytes 0 0 %    Segs Absolute 6.66 1.50 - 8.10 k/uL    Absolute Lymph # 2.09 1.10 - 3.70 k/uL    Absolute Mono # 0.61 0.10 - 1.40 k/uL    Absolute Eos # 0.98 (H) 0.00 - 0.44 k/uL    Basophils Absolute 0.12 0.00 - 0.20 k/uL    Absolute Immature Granulocyte 0.04 0.00 - 0.30 k/uL    WBC Morphology NOT REPORTED     RBC Morphology ANISOCYTOSIS PRESENT     Platelet Estimate NOT REPORTED    Basic Metabolic Panel   Result Value Ref Range    Glucose 85 70 - 99 mg/dL    BUN 8 6 - 20 mg/dL    CREATININE 0.54 0.50 - 0.90 mg/dL    Bun/Cre Ratio 15 9 - 20    Calcium 9.2 8.6 - 10.4 mg/dL    Sodium 139 135 - 144 mmol/L    Potassium 3.8 3.7 - 5.3 mmol/L    Chloride 105 98 - 107 mmol/L    CO2 25 20 - 31 mmol/L    Anion Gap 9 9 - 17 mmol/L    GFR Non-African American >60 >60 mL/min    GFR African American >60 >60 mL/min    GFR Comment          GFR Staging NOT REPORTED    Amylase   Result Value Ref Range    Amylase 65 28 - 100 U/L   Lipase   Result Value Ref Range    Lipase 27 13 - 60 U/L   Hepatic Function Panel   Result Value Ref Range    Albumin 4.2 3.5 - 5.2 g/dL    Alkaline Phosphatase 113 (H) 35 - 104 U/L    ALT 27 5 - 33 U/L    AST 17 <32 U/L    Total Bilirubin 0.11 (L) 0.3 - 1.2 mg/dL    Bilirubin, Direct <0.08 <0.31 mg/dL    Bilirubin, Indirect Can not be calculated 0.00 - 1.00 mg/dL    Total Protein 7.0 6.4 - 8.3 g/dL    Globulin 2.8 1.5 - 3.8 g/dL    Albumin/Globulin Ratio 1.5 1.0 - 2.5   Urinalysis Reflex to Culture    Specimen: Urine, clean catch   Result Value Ref Range    Color, UA NOT REPORTED Yellow    Turbidity UA NOT REPORTED Clear    Glucose, Ur NEGATIVE NEGATIVE    Bilirubin Urine NEGATIVE NEGATIVE    Ketones, Urine NEGATIVE NEGATIVE    Specific Gravity, UA 1.020 1.010 - 1.025    Urine Hgb NEGATIVE NEGATIVE    pH, UA 8.0 (H) 5.0 - 6.0    Protein, UA NEGATIVE NEGATIVE    Urobilinogen, Urine Normal Normal    Nitrite, Urine NEGATIVE NEGATIVE    Leukocyte Esterase, Urine NEGATIVE NEGATIVE    Urinalysis Comments NOT REPORTED    Pregnancy, Urine   Result Value Ref Range    HCG(Urine) Pregnancy Test NEGATIVE NEGATIVE   Microscopic Urinalysis   Result Value Ref Range    -          WBC, UA 0 TO 4 0 - 4 /HPF    RBC, UA 0 TO 4 0 - 4 /HPF    Casts UA NOT REPORTED 0 - 2 /LPF    Crystals, UA NOT REPORTED None /HPF    Epithelial Cells UA 0 TO 4 0 - 5 /HPF    Renal Epithelial, UA NOT REPORTED 0 /HPF    Bacteria, UA 1+ (A) None    Mucus, UA NOT REPORTED None    Trichomonas, UA NOT REPORTED None    Amorphous, UA NOT REPORTED None    Other Observations UA NOT REPORTED NOT REQ. Yeast, UA NOT REPORTED None       Not indicated unless otherwise documented above    RADIOLOGY:   I reviewed the radiologist interpretations:    CT ABDOMEN PELVIS W IV CONTRAST Additional Contrast? None   Final Result   No acute abdominal or pelvic findings.              Not indicated unless otherwise documented above    EMERGENCY DEPARTMENT COURSE:     The patient was given the following medications:  Orders Placed This Encounter   Medications    0.9 % sodium chloride bolus    ondansetron (ZOFRAN) injection 4 mg    0.9 % sodium chloride bolus    morphine injection 4 mg    iopamidol (ISOVUE-370) 76 % injection 100 mL    ondansetron (ZOFRAN ODT) 4 MG disintegrating tablet     Sig: Take 1 tablet by mouth every 8 hours as needed for Nausea     Dispense:  20 tablet     Refill:  0        Vitals:   -------------------------  /67   Pulse 66   Temp 98.9 °F (37.2 °C) (Tympanic)   Resp 14   SpO2 99%     5:10 PM reevaluated patient states that her abdominal pain is worse despite the liter of fluids and Zofran. On reevaluation abdomen is soft but she is now complaining of periumbilical and right lower quadrant abdominal pain. Will give morphine for pain repeat fluid bolus and obtain a CT to rule out appendicitis versus other. 6:45 PM CT of the abdomen on remarkable no appendicitis no kidney stones. No bowel infection. Unclear etiology for abdominal pain with nausea and vomiting. We will give a prescription for Zofran. Nonsurgical abdomen. Follow-up with family physician for reevaluation return if worsening symptoms or any other concerns. The patient understands that at this time there is no evidence for a more malignant underlying process, but also understands that early in the process of an illness or injury, an emergency department workup can be falsely reassuring. Routine discharge counseling was given, and it is understood that worsening, changing or persistent symptoms should prompt an immediate call or follow up with their primary physician or return to the emergency department. The importance of appropriate follow up was also discussed. I have reviewed the disposition diagnosis. I have answered the questions and given discharge instructions. There was voiced understanding of these instructions and no further questions or complaints. CRITICAL CARE:    None    CONSULTS:    None    PROCEDURES:    None      OARRS Report if indicated             FINAL IMPRESSION      1. Non-intractable vomiting with nausea, unspecified vomiting type    2.  Abdominal pain, unspecified abdominal location          DISPOSITION/PLAN   DISPOSITION          CONDITION ON DISPOSITION: STABLE       PATIENT REFERRED TO:  Samantha Gonzáles MD  [address] [facility]  [phone]    Schedule an appointment as soon as possible for a visit in 3 days        DISCHARGE MEDICATIONS:  New Prescriptions ONDANSETRON (ZOFRAN ODT) 4 MG DISINTEGRATING TABLET    Take 1 tablet by mouth every 8 hours as needed for Nausea       (Please note that portions of this note were completed with a voice recognition program.  Efforts were made to edit the dictations but occasionally words are mis-transcribed.)    Addie Do DO   Attending Emergency Physician     Addie Do DO  12/03/21 0838

## 2022-09-11 ENCOUNTER — HOSPITAL ENCOUNTER (EMERGENCY)
Age: 22
Discharge: HOME | End: 2022-09-12
Attending: EMERGENCY MEDICINE
Payer: MEDICARE

## 2022-09-11 DIAGNOSIS — J06.9 VIRAL UPPER RESPIRATORY TRACT INFECTION: Primary | ICD-10-CM

## 2022-09-11 PROCEDURE — 99284 EMERGENCY DEPT VISIT MOD MDM: CPT

## 2022-09-11 PROCEDURE — 81025 URINE PREGNANCY TEST: CPT

## 2022-09-11 PROCEDURE — 87635 SARS-COV-2 COVID-19 AMP PRB: CPT

## 2022-09-11 PROCEDURE — 87804 INFLUENZA ASSAY W/OPTIC: CPT

## 2022-09-11 RX ORDER — BENZONATATE 100 MG/1
100 CAPSULE ORAL ONCE
Status: COMPLETED | OUTPATIENT
Start: 2022-09-12 | End: 2022-09-12

## 2022-09-11 RX ORDER — ACETAMINOPHEN 500 MG
1000 TABLET ORAL ONCE
Status: COMPLETED | OUTPATIENT
Start: 2022-09-12 | End: 2022-09-12

## 2022-09-11 ASSESSMENT — ENCOUNTER SYMPTOMS
SINUS PRESSURE: 1
SINUS PAIN: 1
STRIDOR: 0
COLOR CHANGE: 0
NAUSEA: 0
RHINORRHEA: 1
EYE REDNESS: 0
COUGH: 1
WHEEZING: 0
SHORTNESS OF BREATH: 0
ABDOMINAL PAIN: 1
EYE DISCHARGE: 0
VOMITING: 1
DIARRHEA: 0
SORE THROAT: 0
EYE PAIN: 0
CONSTIPATION: 0

## 2022-09-11 ASSESSMENT — PAIN - FUNCTIONAL ASSESSMENT: PAIN_FUNCTIONAL_ASSESSMENT: 0-10

## 2022-09-11 ASSESSMENT — PAIN SCALES - GENERAL: PAINLEVEL_OUTOF10: 7

## 2022-09-11 NOTE — Clinical Note
Susana Pablo was seen and treated in our emergency department on 9/11/2022. She may return to work on 09/12/2022. If you have any questions or concerns, please don't hesitate to call.       Binnie Cranker, MD

## 2022-09-12 ENCOUNTER — APPOINTMENT (OUTPATIENT)
Dept: GENERAL RADIOLOGY | Age: 22
End: 2022-09-12
Payer: MEDICARE

## 2022-09-12 VITALS
OXYGEN SATURATION: 97 % | TEMPERATURE: 98.2 F | RESPIRATION RATE: 14 BRPM | WEIGHT: 236 LBS | BODY MASS INDEX: 40.29 KG/M2 | SYSTOLIC BLOOD PRESSURE: 116 MMHG | DIASTOLIC BLOOD PRESSURE: 58 MMHG | HEIGHT: 64 IN | HEART RATE: 76 BPM

## 2022-09-12 LAB
FLU A ANTIGEN: NEGATIVE
FLU B ANTIGEN: NEGATIVE
HCG(URINE) PREGNANCY TEST: NEGATIVE
SARS-COV-2, RAPID: NOT DETECTED
SPECIMEN DESCRIPTION: NORMAL

## 2022-09-12 PROCEDURE — 71045 X-RAY EXAM CHEST 1 VIEW: CPT

## 2022-09-12 PROCEDURE — 6370000000 HC RX 637 (ALT 250 FOR IP): Performed by: EMERGENCY MEDICINE

## 2022-09-12 RX ORDER — DIPHENHYDRAMINE HCL 25 MG
25 CAPSULE ORAL EVERY 6 HOURS PRN
Qty: 12 CAPSULE | Refills: 0 | Status: SHIPPED | OUTPATIENT
Start: 2022-09-12 | End: 2022-09-16

## 2022-09-12 RX ORDER — BENZONATATE 100 MG/1
100 CAPSULE ORAL 3 TIMES DAILY PRN
Qty: 21 CAPSULE | Refills: 0 | Status: SHIPPED | OUTPATIENT
Start: 2022-09-12 | End: 2022-09-19

## 2022-09-12 RX ORDER — PREDNISONE 20 MG/1
40 TABLET ORAL ONCE
Status: COMPLETED | OUTPATIENT
Start: 2022-09-12 | End: 2022-09-12

## 2022-09-12 RX ORDER — PREDNISONE 20 MG/1
20 TABLET ORAL 2 TIMES DAILY
Qty: 20 TABLET | Refills: 0 | Status: SHIPPED | OUTPATIENT
Start: 2022-09-12 | End: 2022-09-22

## 2022-09-12 RX ADMIN — ACETAMINOPHEN 1000 MG: 500 TABLET ORAL at 00:23

## 2022-09-12 RX ADMIN — BENZONATATE 100 MG: 100 CAPSULE ORAL at 00:23

## 2022-09-12 RX ADMIN — PREDNISONE 40 MG: 20 TABLET ORAL at 02:10

## 2022-09-12 NOTE — ED PROVIDER NOTES
[facility]  EMERGENCY DEPARTMENT ENCOUNTER      Pt Name: Italia Aburto  MRN: [id number]  [place] [address]  Date of evaluation: 9/11/2022  Provider: Smooth Nelson MD    CHIEF COMPLAINT       Chief Complaint   Patient presents with    Emesis    Cough    Abdominal Pain       HISTORY OF PRESENT ILLNESS  (Location/Symptom, Timing/Onset, Context/Setting, Quality, Duration, Modifying Factors, Severity.)   Susana Pablo is a 25 y.o. female who presents to the emergency department complaining of coughing so hard that she throws up as well as left-sided abdominal pain probably from coughing she thinks. She tells me that her mom thinks she could be pregnant but she has been having periods like normal.  She is not coughing anything up. She has not had a fever. No chills. No nausea. No problems with urination or bowel habits. She is currently on Keflex and started by her family doctor who she saw yesterday for sinus infection. They have also have her on Flonase and Claritin. Looking at their note from yesterday it looks like they suspect COVID infection but have placed her on the sinus medicine also. Nursing Notes were reviewed. REVIEW OF SYSTEMS    (2-9 systems for level 4, 10 or more for level 5)     Review of Systems   Constitutional:  Negative for activity change, appetite change, chills, fatigue and fever. HENT:  Positive for congestion, rhinorrhea, sinus pressure and sinus pain. Negative for ear pain and sore throat. Eyes:  Negative for pain, discharge and redness. Respiratory:  Positive for cough. Negative for shortness of breath, wheezing and stridor. Cardiovascular:  Negative for chest pain. Gastrointestinal:  Positive for abdominal pain and vomiting. Negative for constipation, diarrhea and nausea. Genitourinary:  Negative for decreased urine volume and difficulty urinating. Musculoskeletal:  Negative for arthralgias and myalgias.    Skin:  Negative for color change and rash.   Neurological:  Positive for headaches. Negative for dizziness and weakness. Psychiatric/Behavioral:  Negative for behavioral problems and confusion. All other systems reviewed and are negative. Except as noted above the remainder of the review of systems was reviewed and negative. PAST MEDICAL HISTORY     Past Medical History:   Diagnosis Date    Asthma     Bleeding disorder ([address]     Does not know the name, but says she does not clot. SURGICAL HISTORY       Past Surgical History:   Procedure Laterality Date    CHOLECYSTECTOMY      TYMPANOSTOMY TUBE PLACEMENT         CURRENT MEDICATIONS       Previous Medications    ALBUTEROL SULFATE HFA (VENTOLIN HFA) 108 (90 BASE) MCG/ACT INHALER    Inhale 2 puffs into the lungs every 4 hours as needed for Wheezing or Shortness of Breath (cough)    DICYCLOMINE (BENTYL) 10 MG CAPSULE    Take 2 capsules by mouth every 6 hours as needed (cramps)    ONDANSETRON (ZOFRAN ODT) 4 MG DISINTEGRATING TABLET    Take 1 tablet by mouth every 8 hours as needed for Nausea       ALLERGIES     Patient has no known allergies. FAMILY HISTORY     History reviewed. No pertinent family history. No family status information on file. SOCIAL HISTORY      reports that she has been smoking cigarettes. She has a 2.50 pack-year smoking history. She has never used smokeless tobacco. She reports current alcohol use. She reports current drug use. Frequency: 1.00 time per week. Drug: Marijuana [place] Betty). PHYSICAL EXAM    (up to 7 for level 4, 8 or more for level 5)     Physical Exam  Vitals and nursing note reviewed. Constitutional:       General: She is not in acute distress. Appearance: She is well-developed. She is not ill-appearing, toxic-appearing or diaphoretic. HENT:      Head: Normocephalic and atraumatic.       Right Ear: External ear normal.      Left Ear: External ear normal.      Nose: Nose normal.      Mouth/Throat:      Mouth: Mucous membranes are moist. Pharynx: Posterior oropharyngeal erythema present. No pharyngeal swelling or oropharyngeal exudate. Eyes:      General:         Right eye: No discharge. Left eye: No discharge. Conjunctiva/sclera: Conjunctivae normal.      Pupils: Pupils are equal, round, and reactive to light. Cardiovascular:      Rate and Rhythm: Normal rate and regular rhythm. Heart sounds: Normal heart sounds. No murmur heard. Pulmonary:      Effort: Pulmonary effort is normal. No respiratory distress. Breath sounds: Normal breath sounds. No wheezing, rhonchi or rales. Chest:      Chest wall: No tenderness. Abdominal:      General: Bowel sounds are normal. There is no distension. Palpations: Abdomen is soft. There is no mass. Tenderness: no abdominal tenderness There is no guarding or rebound. Musculoskeletal:         General: Normal range of motion. Cervical back: Normal range of motion and neck supple. Right lower leg: No edema. Left lower leg: No edema. Lymphadenopathy:      Cervical: No cervical adenopathy. Skin:     General: Skin is warm. Findings: No rash. Neurological:      General: No focal deficit present. Mental Status: She is alert and oriented to person, place, and time. Cranial Nerves: No cranial nerve deficit. Sensory: No sensory deficit. Motor: No weakness or abnormal muscle tone.       Coordination: Coordination normal.      Gait: Gait normal.   Psychiatric:         Mood and Affect: Mood normal.         Behavior: Behavior normal.        DIAGNOSTIC RESULTS     EKG: All EKG's are interpreted by the Emergency Department Physician who either signs or Co-signs this chart in the absence of a cardiologist.    none    RADIOLOGY:   Non-plain film images such as CT, Ultrasound and MRI are read by the radiologist.   Interpretation per the Radiologist below, if available at the time of this note:    XR CHEST PORTABLE   Preliminary Result   Negative portable chest x-ray. No definable acute cardiopulmonary   abnormality. ED BEDSIDE ULTRASOUND:   Performed by ED Physician - none    LABS:  Labs Reviewed   COVID-19, RAPID   RAPID INFLUENZA A/B ANTIGENS   PREGNANCY, URINE       All other labs were within normal range or not returned as of this dictation. EMERGENCY DEPARTMENT COURSE and DIFFERENTIAL DIAGNOSIS/MDM:   Vitals:    Vitals:    09/11/22 2331   BP: (!) 123/91   Pulse: 82   Resp: 17   SpO2: 96%   Weight: 236 lb (107 kg)   Height: 5' 4\" (1.626 m)     I think the patient has likely a viral illness. She really coughs more when I am walking in the room but when I am not in the room the cough seems to improve. I think she is uncomfortable but I think her exam discomfort is exaggerated. She really has a benign exam for me overall. She has some mild erythema to the posterior pharynx. Her lungs are nice and clear. I do not really appreciate any abnormality of the heart. Her abdomen is soft and nontender for me as well. However, she is agreeable to getting swabs today. I do not see a sign of a negative test yesterday so we will repeat COVID and flu testing today. I also get a chest x-ray to ensure that she does not have a pneumonia. She is also asking if she can get a pregnancy test.    I did go over results with her. She knows to follow-up with family physician for any further concerns. She does request a work note for leaving early from work. CONSULTS:  None    PROCEDURES:  None    FINAL IMPRESSION      1.  Viral upper respiratory tract infection          DISPOSITION/PLAN   DISPOSITION Decision To Discharge 09/12/2022 01:58:32 AM      PATIENT REFERRED TO:  Martha Amato MD  [address]   [phone]    Call in 1 day  As needed    DISCHARGE MEDICATIONS:  New Prescriptions    BENZONATATE (TESSALON PERLES) 100 MG CAPSULE    Take 1 capsule by mouth 3 times daily as needed for Cough    DIPHENHYDRAMINE (BENADRYL) 25 MG CAPSULE    Take 1 capsule by mouth every 6 hours as needed for Itching    PREDNISONE (DELTASONE) 20 MG TABLET    Take 1 tablet by mouth 2 times daily for 10 days       (Please note that portions of this note were completed with a voice recognition program.  Efforts were made to edit the dictations but occasionally words are mis-transcribed.)    Charo Ulrich MD  Attending Emergency Physician        Charo Ulrich MD  09/12/22 7212

## 2022-10-12 ENCOUNTER — OFFICE VISIT (OUTPATIENT)
Dept: PRIMARY CARE CLINIC | Age: 22
End: 2022-10-12
Payer: MEDICARE

## 2022-10-12 ENCOUNTER — HOSPITAL ENCOUNTER (OUTPATIENT)
Dept: GENERAL RADIOLOGY | Age: 22
Discharge: HOME | End: 2022-10-14
Payer: MEDICARE

## 2022-10-12 VITALS
BODY MASS INDEX: 38.48 KG/M2 | RESPIRATION RATE: 18 BRPM | HEIGHT: 64 IN | TEMPERATURE: 97.7 F | OXYGEN SATURATION: 98 % | SYSTOLIC BLOOD PRESSURE: 112 MMHG | DIASTOLIC BLOOD PRESSURE: 74 MMHG | HEART RATE: 70 BPM | WEIGHT: 225.38 LBS

## 2022-10-12 DIAGNOSIS — S99.911A INJURY OF RIGHT ANKLE, INITIAL ENCOUNTER: ICD-10-CM

## 2022-10-12 DIAGNOSIS — S93.401A SPRAIN OF RIGHT ANKLE, UNSPECIFIED LIGAMENT, INITIAL ENCOUNTER: Primary | ICD-10-CM

## 2022-10-12 PROCEDURE — L1902 AFO ANKLE GAUNTLET PRE OTS: HCPCS | Performed by: FAMILY MEDICINE

## 2022-10-12 PROCEDURE — 99212 OFFICE O/P EST SF 10 MIN: CPT | Performed by: NURSE PRACTITIONER

## 2022-10-12 PROCEDURE — 73610 X-RAY EXAM OF ANKLE: CPT

## 2022-10-12 PROCEDURE — 99213 OFFICE O/P EST LOW 20 MIN: CPT | Performed by: FAMILY MEDICINE

## 2022-10-12 PROCEDURE — G8484 FLU IMMUNIZE NO ADMIN: HCPCS | Performed by: FAMILY MEDICINE

## 2022-10-12 PROCEDURE — G8427 DOCREV CUR MEDS BY ELIG CLIN: HCPCS | Performed by: FAMILY MEDICINE

## 2022-10-12 PROCEDURE — G8417 CALC BMI ABV UP PARAM F/U: HCPCS | Performed by: FAMILY MEDICINE

## 2022-10-12 PROCEDURE — 4004F PT TOBACCO SCREEN RCVD TLK: CPT | Performed by: FAMILY MEDICINE

## 2022-10-12 RX ORDER — FLUTICASONE PROPIONATE 50 MCG
1 SPRAY, SUSPENSION (ML) NASAL 2 TIMES DAILY
COMMUNITY
Start: 2022-09-09

## 2022-10-12 NOTE — Clinical Note
[facility] A department of [facility]  Phone: [phone]  Fax: [phone]    CHARISMA Chnog CNP        October 12, 2022     Patient: Alessia Toscano   YOB: 2000   Date of Visit: 10/12/2022       To Whom It May Concern: It is my medical opinion that Susana Pablo {Work release (duty restriction):90072}. If you have any questions or concerns, please don't hesitate to call.     Sincerely,        CHARISMA Chong CNP

## 2022-10-12 NOTE — PROGRESS NOTES
10/12/2022     Susana Pablo (:  2000) is a 25 y.o. female, here for evaluation of the following medical concerns: Ankle Pain   The incident occurred 3 to 6 hours ago (earlier today was walking down stairs and missed step. Twisted right ankle and fell onto ankle and felt a pop in ankle). The injury mechanism was a fall and an inversion injury. The pain is present in the right ankle. The quality of the pain is described as aching. The pain is moderate. The pain has been Constant since onset. Associated symptoms include an inability to bear weight. Pertinent negatives include no numbness. Associated symptoms comments: Swelling to the joint. . The symptoms are aggravated by palpation, weight bearing and movement. Did review patient's med list, allergies, social history,pmhx and pshx today as noted in the record. Review of Systems   Constitutional:  Negative for chills, fatigue and fever. Musculoskeletal:  Positive for arthralgias, gait problem and joint swelling. Neurological:  Negative for weakness and numbness. Prior to Visit Medications    Medication Sig Taking?  Authorizing Provider   sertraline (ZOLOFT) 50 MG tablet Take 50 mg by mouth daily Yes Historical Provider, MD   fluticasone (FLONASE) 50 MCG/ACT nasal spray 1 spray by Nasal route 2 times daily Yes Historical Provider, MD   albuterol sulfate HFA (VENTOLIN HFA) 108 (90 Base) MCG/ACT inhaler Inhale 2 puffs into the lungs every 4 hours as needed for Wheezing or Shortness of Breath (cough)  Historical Provider, MD        Social History     Tobacco Use    Smoking status: Every Day     Packs/day: 0.25     Years: 10.00     Pack years: 2.50     Types: Cigarettes    Smokeless tobacco: Never   Substance Use Topics    Alcohol use: Yes     Comment: Occasionally        Vitals:    10/12/22 1513   BP: 112/74   Site: Left Upper Arm   Position: Sitting   Cuff Size: Large Adult   Pulse: 70   Resp: 18   Temp: 97.7 °F (36.5 °C)   TempSrc: Tympanic Brace. The right ankle will require stabilization / immobilization from this semi-rigid / rigid orthosis to improve their function. The orthosis will assist in protecting the affected area, provide functional support and facilitate healing. The patient was educated and fit by a healthcare professional with expert knowledge and specialization in brace application while under the direct supervision of the treating physician. Verbal and written instructions for the use of and application of this item were provided. They were instructed to contact the office immediately should the brace result in increased pain, decreased sensation, increased swelling or worsening of the condition. I did personally review her xrays with her today. I do not appreciate any acute pathology. Will place patient in air cast splint and did recommend ice, rest and elevation. Tylenol/Motrin prn    Return  if no improvement in symptoms or if any further symptoms arise. No follow-ups on file. An electronic signature was used to authenticate this note.     --Aline Zaldivar, DO on 10/12/2022 at 4:21 PM

## 2022-10-12 NOTE — LETTER
[facility] A department of [place] [address]  Phone: [phone]  Fax: [address], DO        October 12, 2022     Patient: Colleen James   YOB: 2000   Date of Visit: 10/12/2022       To Whom It May Concern: It is my medical opinion that Susana Pablo should remain out of work until 10/16/2022. If you have any questions or concerns, please don't hesitate to call.     Sincerely,        Conrado Mendes, DO

## 2022-10-27 ENCOUNTER — OFFICE VISIT (OUTPATIENT)
Dept: FAMILY MEDICINE CLINIC | Age: 22
End: 2022-10-27
Payer: MEDICARE

## 2022-10-27 VITALS
HEIGHT: 64 IN | WEIGHT: 225.3 LBS | HEART RATE: 67 BPM | TEMPERATURE: 98.2 F | OXYGEN SATURATION: 99 % | RESPIRATION RATE: 16 BRPM | DIASTOLIC BLOOD PRESSURE: 82 MMHG | SYSTOLIC BLOOD PRESSURE: 128 MMHG | BODY MASS INDEX: 38.46 KG/M2

## 2022-10-27 DIAGNOSIS — J06.9 VIRAL URI: Primary | ICD-10-CM

## 2022-10-27 DIAGNOSIS — Z13.31 POSITIVE DEPRESSION SCREENING: ICD-10-CM

## 2022-10-27 PROCEDURE — G8484 FLU IMMUNIZE NO ADMIN: HCPCS | Performed by: NURSE PRACTITIONER

## 2022-10-27 PROCEDURE — G8427 DOCREV CUR MEDS BY ELIG CLIN: HCPCS | Performed by: NURSE PRACTITIONER

## 2022-10-27 PROCEDURE — 99213 OFFICE O/P EST LOW 20 MIN: CPT | Performed by: NURSE PRACTITIONER

## 2022-10-27 PROCEDURE — 99211 OFF/OP EST MAY X REQ PHY/QHP: CPT | Performed by: NURSE PRACTITIONER

## 2022-10-27 PROCEDURE — G8417 CALC BMI ABV UP PARAM F/U: HCPCS | Performed by: NURSE PRACTITIONER

## 2022-10-27 PROCEDURE — 4004F PT TOBACCO SCREEN RCVD TLK: CPT | Performed by: NURSE PRACTITIONER

## 2022-10-27 PROCEDURE — PBSHW PBB SHADOW CHARGE: Performed by: NURSE PRACTITIONER

## 2022-10-27 RX ORDER — BENZONATATE 100 MG/1
100 CAPSULE ORAL 3 TIMES DAILY PRN
Qty: 15 CAPSULE | Refills: 0 | Status: SHIPPED | OUTPATIENT
Start: 2022-10-27

## 2022-10-27 SDOH — ECONOMIC STABILITY: FOOD INSECURITY: WITHIN THE PAST 12 MONTHS, THE FOOD YOU BOUGHT JUST DIDN'T LAST AND YOU DIDN'T HAVE MONEY TO GET MORE.: NEVER TRUE

## 2022-10-27 SDOH — ECONOMIC STABILITY: FOOD INSECURITY: WITHIN THE PAST 12 MONTHS, YOU WORRIED THAT YOUR FOOD WOULD RUN OUT BEFORE YOU GOT MONEY TO BUY MORE.: NEVER TRUE

## 2022-10-27 ASSESSMENT — PATIENT HEALTH QUESTIONNAIRE - PHQ9
3. TROUBLE FALLING OR STAYING ASLEEP: 2
8. MOVING OR SPEAKING SO SLOWLY THAT OTHER PEOPLE COULD HAVE NOTICED. OR THE OPPOSITE, BEING SO FIGETY OR RESTLESS THAT YOU HAVE BEEN MOVING AROUND A LOT MORE THAN USUAL: 0
9. THOUGHTS THAT YOU WOULD BE BETTER OFF DEAD, OR OF HURTING YOURSELF: 0
SUM OF ALL RESPONSES TO PHQ9 QUESTIONS 1 & 2: 3
10. IF YOU CHECKED OFF ANY PROBLEMS, HOW DIFFICULT HAVE THESE PROBLEMS MADE IT FOR YOU TO DO YOUR WORK, TAKE CARE OF THINGS AT HOME, OR GET ALONG WITH OTHER PEOPLE: 1
SUM OF ALL RESPONSES TO PHQ QUESTIONS 1-9: 12
SUM OF ALL RESPONSES TO PHQ QUESTIONS 1-9: 12
6. FEELING BAD ABOUT YOURSELF - OR THAT YOU ARE A FAILURE OR HAVE LET YOURSELF OR YOUR FAMILY DOWN: 1
SUM OF ALL RESPONSES TO PHQ QUESTIONS 1-9: 12
1. LITTLE INTEREST OR PLEASURE IN DOING THINGS: 2
7. TROUBLE CONCENTRATING ON THINGS, SUCH AS READING THE NEWSPAPER OR WATCHING TELEVISION: 2
2. FEELING DOWN, DEPRESSED OR HOPELESS: 1
SUM OF ALL RESPONSES TO PHQ QUESTIONS 1-9: 12
5. POOR APPETITE OR OVEREATING: 3
4. FEELING TIRED OR HAVING LITTLE ENERGY: 1

## 2022-10-27 ASSESSMENT — SOCIAL DETERMINANTS OF HEALTH (SDOH): HOW HARD IS IT FOR YOU TO PAY FOR THE VERY BASICS LIKE FOOD, HOUSING, MEDICAL CARE, AND HEATING?: NOT HARD AT ALL

## 2022-10-27 ASSESSMENT — ENCOUNTER SYMPTOMS: COUGH: 1

## 2022-10-27 NOTE — PROGRESS NOTES
[address] [place] [address] [address] [facility], APRN-[facility]  [address]  Phone:  [phone]  Fax:  [phone]  Susana Pablo is a 25 y.o. female who presents today for her medical conditions/complaints as noted below. Susana Pablo c/o of Cough      HPI:     URI   This is a new problem. The current episode started yesterday. Associated symptoms include coughing. Wt Readings from Last 3 Encounters:   10/27/22 225 lb 4.8 oz (102.2 kg)   10/12/22 225 lb 6 oz (102.2 kg)   09/11/22 236 lb (107 kg)       Temp Readings from Last 3 Encounters:   10/27/22 98.2 °F (36.8 °C) (Oral)   10/12/22 97.7 °F (36.5 °C) (Tympanic)   09/11/22 98.2 °F (36.8 °C) (Tympanic)       BP Readings from Last 3 Encounters:   10/27/22 128/82   10/12/22 112/74   09/12/22 (!) 116/58       Pulse Readings from Last 3 Encounters:   10/27/22 67   10/12/22 70   09/12/22 76        SpO2 Readings from Last 3 Encounters:   10/27/22 99%   10/12/22 98%   09/12/22 97%             Past Medical History:   Diagnosis Date    Asthma     Bleeding disorder ([address]     Does not know the name, but says she does not clot. Past Surgical History:   Procedure Laterality Date    CHOLECYSTECTOMY      TYMPANOSTOMY TUBE PLACEMENT       History reviewed. No pertinent family history.   Social History     Tobacco Use    Smoking status: Every Day     Packs/day: 0.25     Years: 10.00     Pack years: 2.50     Types: Cigarettes    Smokeless tobacco: Never   Substance Use Topics    Alcohol use: Yes     Comment: Occasionally      Current Outpatient Medications   Medication Sig Dispense Refill    benzonatate (TESSALON PERLES) 100 MG capsule Take 1 capsule by mouth 3 times daily as needed for Cough 15 capsule 0    sertraline (ZOLOFT) 50 MG tablet Take 50 mg by mouth daily      fluticasone (FLONASE) 50 MCG/ACT nasal spray 1 spray by Nasal route 2 times daily      albuterol sulfate HFA (PROVENTIL;VENTOLIN;PROAIR) 108 (90 Base) MCG/ACT inhaler Inhale 2 puffs into the lungs every 4 hours as needed for Wheezing or Shortness of Breath (cough)       No current facility-administered medications for this visit. Allergies   Allergen Reactions    Milk-Related Compounds Diarrhea     Milk Protein Intolernace       No results found. Subjective:      Review of Systems   Constitutional:  Positive for fatigue. Respiratory:  Positive for cough. Objective:     /82 (Site: Right Upper Arm, Position: Sitting, Cuff Size: Medium Adult)   Pulse 67   Temp 98.2 °F (36.8 °C) (Oral)   Resp 16   Ht 5' 4\" (1.626 m)   Wt 225 lb 4.8 oz (102.2 kg)   LMP 10/04/2022 (Within Days)   SpO2 99%   BMI 38.67 kg/m²     Physical Exam  Constitutional:       General: She is not in acute distress. Appearance: She is well-developed. HENT:      Head: Normocephalic. Right Ear: Tympanic membrane, ear canal and external ear normal.      Left Ear: Tympanic membrane, ear canal and external ear normal.      Nose: Nose normal.      Mouth/Throat:      Mouth: Mucous membranes are moist.      Pharynx: Oropharynx is clear. Uvula midline. Eyes:      Conjunctiva/sclera: Conjunctivae normal.      Pupils: Pupils are equal, round, and reactive to light. Cardiovascular:      Rate and Rhythm: Normal rate and regular rhythm. Pulses: Normal pulses. Heart sounds: Normal heart sounds. No murmur heard. Pulmonary:      Effort: Pulmonary effort is normal.      Breath sounds: Normal breath sounds. No wheezing. Abdominal:      General: Bowel sounds are normal.      Palpations: Abdomen is soft. There is no mass. Tenderness: There is no abdominal tenderness. Musculoskeletal:         General: Normal range of motion. Cervical back: Normal range of motion and neck supple. Lymphadenopathy:      Cervical: No cervical adenopathy. Skin:     General: Skin is warm and dry. Findings: No rash.    Neurological:      Mental Status: She is alert and oriented to person, place, and time.   Psychiatric:         Thought Content: Thought content normal.       Assessment:      Diagnosis Orders   1. Viral URI  benzonatate (TESSALON PERLES) 100 MG capsule      2. Positive depression screening          No results found for this visit on 10/27/22. Plan:     Sees OB for depression treatment. Tessalon anali - 1 every 8 hours as needed for cough. Treated with over the counter medications if age appropriate. Patient can use Tylenol or Ibuprofen. Over the counter cough syrup if over the age of 10. Antibiotics are not indicated at the present time. Cough treatment if over the age of 1 - 1 teaspoon of (Local honey if able to find) honey mixed with squeezed  fresh lemon juice. Mix in warm water or take directly. This decreases sore throat pain and reduces cough. May be repeated every 2 hours as needed for cough. Advised to follow up if does not get better or symptoms worsen. Go to Urgent Care of ER if you become dehydrated, have breathing difficulty, or have extreme weakness. Cough may last from 4 to 6 weeks. There are no Patient Instructions on file for this visit. Patient/Caregiver instructed on use, benefit, and side effects of prescribed medications. All patient/parent/caregiver questions answered. Patient/parent/caregiver voiced understanding. Reviewed health maintenance. Instructed to continue current medications, diet and exercise. Patient agreed with treatment plan. Follow up as directed.            Electronically signed by CHARISMA Jordan NP on10/27/2022

## 2022-11-04 ENCOUNTER — OFFICE VISIT (OUTPATIENT)
Dept: PRIMARY CARE CLINIC | Age: 22
End: 2022-11-04
Payer: MEDICARE

## 2022-11-04 ENCOUNTER — HOSPITAL ENCOUNTER (OUTPATIENT)
Age: 22
Setting detail: SPECIMEN
Discharge: HOME | End: 2022-11-04
Payer: MEDICARE

## 2022-11-04 VITALS
TEMPERATURE: 97.7 F | SYSTOLIC BLOOD PRESSURE: 110 MMHG | DIASTOLIC BLOOD PRESSURE: 78 MMHG | WEIGHT: 223 LBS | BODY MASS INDEX: 38.07 KG/M2 | HEIGHT: 64 IN | HEART RATE: 68 BPM | OXYGEN SATURATION: 98 %

## 2022-11-04 DIAGNOSIS — R82.71 BACTERIA IN URINE: Primary | ICD-10-CM

## 2022-11-04 DIAGNOSIS — R35.0 URINARY FREQUENCY: ICD-10-CM

## 2022-11-04 DIAGNOSIS — R10.9 FLANK PAIN: ICD-10-CM

## 2022-11-04 LAB
BACTERIA: ABNORMAL
BILIRUBIN URINE: NEGATIVE
EPITHELIAL CELLS UA: ABNORMAL /HPF (ref 0–5)
GLUCOSE URINE: NEGATIVE
KETONES, URINE: NEGATIVE
LEUKOCYTE ESTERASE, URINE: ABNORMAL
NITRITE, URINE: NEGATIVE
PH UA: 7 (ref 5–6)
PROTEIN UA: NEGATIVE
RBC UA: ABNORMAL /HPF (ref 0–4)
SPECIFIC GRAVITY UA: 1.02 (ref 1.01–1.02)
URINE HGB: NEGATIVE
UROBILINOGEN, URINE: NORMAL
WBC UA: ABNORMAL /HPF (ref 0–4)

## 2022-11-04 PROCEDURE — 81001 URINALYSIS AUTO W/SCOPE: CPT

## 2022-11-04 PROCEDURE — 99213 OFFICE O/P EST LOW 20 MIN: CPT | Performed by: NURSE PRACTITIONER

## 2022-11-04 PROCEDURE — G8427 DOCREV CUR MEDS BY ELIG CLIN: HCPCS | Performed by: NURSE PRACTITIONER

## 2022-11-04 PROCEDURE — G8484 FLU IMMUNIZE NO ADMIN: HCPCS | Performed by: NURSE PRACTITIONER

## 2022-11-04 PROCEDURE — 99212 OFFICE O/P EST SF 10 MIN: CPT | Performed by: NURSE PRACTITIONER

## 2022-11-04 PROCEDURE — 4004F PT TOBACCO SCREEN RCVD TLK: CPT | Performed by: NURSE PRACTITIONER

## 2022-11-04 PROCEDURE — G8417 CALC BMI ABV UP PARAM F/U: HCPCS | Performed by: NURSE PRACTITIONER

## 2022-11-04 RX ORDER — NITROFURANTOIN 25; 75 MG/1; MG/1
100 CAPSULE ORAL 2 TIMES DAILY
Qty: 14 CAPSULE | Refills: 0 | Status: SHIPPED | OUTPATIENT
Start: 2022-11-04 | End: 2022-11-11

## 2022-11-04 ASSESSMENT — ENCOUNTER SYMPTOMS
NAUSEA: 0
RESPIRATORY NEGATIVE: 1
VOMITING: 0

## 2022-11-04 NOTE — LETTER
[address] [facility] A department of [place] [address]  Phone: [phone]  Fax: [phone]        CHARISMA Cardenas CNP      November 4, 2022    Patient:   Lakshmi Pablo  Date of Birth   2000  Date of visit   11/4/2022        To Whom it May Concern:      Susana Pablo was seen in my clinic on 11/4/2022. Please excuse from work 11/04/22. If you have any questions or concerns, please don't hesitate to call.       Sincerely,      CHARISMA Cardenas CNP

## 2022-11-04 NOTE — PROGRESS NOTES
[facility]             [address] [facility], [address]                        Telephone [phone]             Fax [phone]  2000  QKY:[id number]   Date of visit:  11/4/2022    Subjective:    Susana Pablo is a 25 y.o.  female who presents to [facility] today (11/4/2022) for evaluation of:    Chief Complaint   Patient presents with    Urinary Frequency     Left lower abdominal pain,fever,n/v. Sx began yesterday. Urinary Frequency   This is a new problem. The current episode started in the past 7 days (X 2 days). The problem has been gradually worsening. The patient is experiencing no pain. The maximum temperature recorded prior to her arrival was 101 - 101.9 F (yesterday, now resolved). She is Sexually active. There is No history of pyelonephritis. Associated symptoms include frequency, hesitancy and urgency. Pertinent negatives include no chills, discharge, flank pain, hematuria, nausea, possible pregnancy or vomiting. Associated symptoms comments: Left lower abdominal pain began this morning 6/10. She has tried nothing for the symptoms. The treatment provided no relief. She has the following problem list:  There is no problem list on file for this patient.        Current medications are:  Current Outpatient Medications   Medication Sig Dispense Refill    nitrofurantoin, macrocrystal-monohydrate, (MACROBID) 100 MG capsule Take 1 capsule by mouth 2 times daily for 7 days 14 capsule 0    sertraline (ZOLOFT) 50 MG tablet Take 50 mg by mouth daily      benzonatate (TESSALON PERLES) 100 MG capsule Take 1 capsule by mouth 3 times daily as needed for Cough (Patient not taking: Reported on 11/4/2022) 15 capsule 0    fluticasone (FLONASE) 50 MCG/ACT nasal spray 1 spray by Nasal route 2 times daily (Patient not taking: Reported on 11/4/2022)      albuterol sulfate HFA (PROVENTIL;VENTOLIN;PROAIR) 108 (90 Base) MCG/ACT inhaler Inhale 2 puffs into the lungs every 4 hours as needed for Wheezing or Shortness of Breath (cough)       No current facility-administered medications for this visit. She is allergic to milk-related compounds. .    She  reports that she has been smoking cigarettes. She has a 2.50 pack-year smoking history. She has never used smokeless tobacco.      Objective:    Vitals:    11/04/22 1525   BP: 110/78   Pulse: 68   Temp: 97.7 °F (36.5 °C)   TempSrc: Tympanic   SpO2: 98%   Weight: 223 lb (101.2 kg)   Height: 5' 4\" (1.626 m)     Body mass index is 38.28 kg/m². Review of Systems   Constitutional: Negative. Negative for chills. Respiratory: Negative. Cardiovascular: Negative. Gastrointestinal:  Negative for nausea and vomiting. Genitourinary:  Positive for frequency, hesitancy and urgency. Negative for dysuria, flank pain, hematuria and vaginal discharge. Physical Exam  Vitals and nursing note reviewed. Constitutional:       Appearance: Normal appearance. She is well-developed. HENT:      Head: Normocephalic. Jaw: There is normal jaw occlusion. Mouth/Throat:      Lips: Pink. Mouth: Mucous membranes are moist.      Pharynx: Oropharynx is clear. Uvula midline. Eyes:      Pupils: Pupils are equal, round, and reactive to light. Cardiovascular:      Rate and Rhythm: Normal rate and regular rhythm. Heart sounds: Normal heart sounds. Pulmonary:      Effort: Pulmonary effort is normal.      Breath sounds: Normal breath sounds and air entry. Abdominal:      General: Abdomen is flat. Bowel sounds are normal.      Palpations: Abdomen is soft. Tenderness: There is no abdominal tenderness. There is no right CVA tenderness or left CVA tenderness. Musculoskeletal:      Cervical back: Normal range of motion and neck supple. Skin:     General: Skin is warm and dry.    Neurological:      General: No focal deficit present. Mental Status: She is alert and oriented to person, place, and time. Psychiatric:         Behavior: Behavior normal.         Thought Content: Thought content normal.       Assessment and Plan:    Hospital Outpatient Visit on 11/04/2022   Component Date Value Ref Range Status    Glucose, Ur 11/04/2022 NEGATIVE  NEGATIVE Final    Bilirubin Urine 11/04/2022 NEGATIVE  NEGATIVE Final    Ketones, Urine 11/04/2022 NEGATIVE  NEGATIVE Final    Specific Gravity, UA 11/04/2022 1.020  1.010 - 1.025 Final    Urine Hgb 11/04/2022 NEGATIVE  NEGATIVE Final    pH, UA 11/04/2022 7.0 (A)  5.0 - 6.0 Final    Protein, UA 11/04/2022 NEGATIVE  NEGATIVE Final    Urobilinogen, Urine 11/04/2022 Normal  Normal Final    Nitrite, Urine 11/04/2022 NEGATIVE  NEGATIVE Final    Leukocyte Esterase, Urine 11/04/2022 1+ (A)  NEGATIVE Final    WBC, UA 11/04/2022 0 TO 4  0 - 4 /HPF Final    RBC, UA 11/04/2022 0 TO 4  0 - 4 /HPF Final    Epithelial Cells UA 11/04/2022 5 TO 10  0 - 5 /HPF Final    Bacteria, UA 11/04/2022 TRACE (A)  None Final          Diagnosis Orders   1. Bacteria in urine  nitrofurantoin, macrocrystal-monohydrate, (MACROBID) 100 MG capsule      2. Urinary frequency          I reviewed labs with patient. Complete full course of antibiotic. Increase water intake. Call or return to clinic as needed if these symptoms worsen or fail to improve in the next 48 hours. If urine culture was sent, the patient will be notified of results. The use, risks, benefits, and side effects of prescribed or recommended medications were discussed. All questions were answered and the patient/caregiver voiced understanding. No orders of the defined types were placed in this encounter.         Electronically signed by CHARISMA Johnson CNP on 11/4/22 at 3:29 PM EDT

## 2022-12-10 ENCOUNTER — OFFICE VISIT (OUTPATIENT)
Dept: PRIMARY CARE CLINIC | Age: 22
End: 2022-12-10
Payer: MEDICARE

## 2022-12-10 VITALS
RESPIRATION RATE: 20 BRPM | SYSTOLIC BLOOD PRESSURE: 112 MMHG | HEART RATE: 67 BPM | DIASTOLIC BLOOD PRESSURE: 80 MMHG | BODY MASS INDEX: 39.01 KG/M2 | WEIGHT: 228.5 LBS | OXYGEN SATURATION: 99 % | HEIGHT: 64 IN | TEMPERATURE: 97.8 F

## 2022-12-10 DIAGNOSIS — R11.2 NAUSEA AND VOMITING, UNSPECIFIED VOMITING TYPE: ICD-10-CM

## 2022-12-10 DIAGNOSIS — A08.4 VIRAL GASTROENTERITIS: ICD-10-CM

## 2022-12-10 DIAGNOSIS — R19.7 DIARRHEA OF PRESUMED INFECTIOUS ORIGIN: ICD-10-CM

## 2022-12-10 DIAGNOSIS — R09.82 POST-NASAL DRIP: ICD-10-CM

## 2022-12-10 DIAGNOSIS — R05.9 COUGH, UNSPECIFIED TYPE: ICD-10-CM

## 2022-12-10 DIAGNOSIS — J06.9 VIRAL UPPER RESPIRATORY ILLNESS: Primary | ICD-10-CM

## 2022-12-10 DIAGNOSIS — J34.89 RHINORRHEA: ICD-10-CM

## 2022-12-10 LAB
INFLUENZA A ANTIBODY: NEGATIVE
INFLUENZA B ANTIBODY: NEGATIVE

## 2022-12-10 PROCEDURE — 87804 INFLUENZA ASSAY W/OPTIC: CPT | Performed by: NURSE PRACTITIONER

## 2022-12-10 PROCEDURE — G8417 CALC BMI ABV UP PARAM F/U: HCPCS | Performed by: NURSE PRACTITIONER

## 2022-12-10 PROCEDURE — 4004F PT TOBACCO SCREEN RCVD TLK: CPT | Performed by: NURSE PRACTITIONER

## 2022-12-10 PROCEDURE — G8427 DOCREV CUR MEDS BY ELIG CLIN: HCPCS | Performed by: NURSE PRACTITIONER

## 2022-12-10 PROCEDURE — 99212 OFFICE O/P EST SF 10 MIN: CPT | Performed by: NURSE PRACTITIONER

## 2022-12-10 PROCEDURE — G8484 FLU IMMUNIZE NO ADMIN: HCPCS | Performed by: NURSE PRACTITIONER

## 2022-12-10 PROCEDURE — 99213 OFFICE O/P EST LOW 20 MIN: CPT | Performed by: NURSE PRACTITIONER

## 2022-12-10 RX ORDER — FLUTICASONE PROPIONATE 50 MCG
1 SPRAY, SUSPENSION (ML) NASAL 2 TIMES DAILY
Qty: 16 G | Refills: 0 | Status: SHIPPED | OUTPATIENT
Start: 2022-12-10

## 2022-12-10 RX ORDER — ONDANSETRON 4 MG/1
4 TABLET, ORALLY DISINTEGRATING ORAL 3 TIMES DAILY PRN
Qty: 21 TABLET | Refills: 0 | Status: SHIPPED | OUTPATIENT
Start: 2022-12-10

## 2022-12-10 RX ORDER — LOPERAMIDE HYDROCHLORIDE 2 MG/1
2 CAPSULE ORAL 4 TIMES DAILY PRN
Qty: 12 CAPSULE | Refills: 0 | Status: SHIPPED | OUTPATIENT
Start: 2022-12-10 | End: 2022-12-20

## 2022-12-10 RX ORDER — LORATADINE 10 MG/1
10 TABLET ORAL DAILY
Qty: 30 TABLET | Refills: 0 | Status: SHIPPED | OUTPATIENT
Start: 2022-12-10 | End: 2023-01-09

## 2022-12-10 ASSESSMENT — ENCOUNTER SYMPTOMS
ABDOMINAL PAIN: 1
DIARRHEA: 1
WHEEZING: 1
SORE THROAT: 0
COUGH: 1
SHORTNESS OF BREATH: 1
VOMITING: 1
NAUSEA: 1
RHINORRHEA: 0

## 2022-12-10 NOTE — PATIENT INSTRUCTIONS
You are encouraged to drink small but frequent amounts of water and slowly advance diet as tolerated. No spicy, acidic or citrus foods and drinks. They were also encouraged to drink an electrolyte replacement such as Gatorade, G2, Propel or Pedialyte to help avoid dehydration. Recommended over the counter medications/treatments: The use of an antihistamine (Claritin or Zyrtec) and a nasal steroid spray (Flonase or Nasacort) may help with sinus congestion and drainage. Mucinex will also help thin secretions and improve congestion. Works best if drinking plenty of water. Honey with or without Lemon may also help with coughing. Probiotics daily may help boost immune system. Alternating hot liquids with cold liquids helps to sooth sore throat  Use Acetaminophen (Tylenol) to help relieve fever, chills or body aches. If allowed, you may alternate using ibuprofen (Motrin) and Tylenol. Do not exceed 3,000mg of Tylenol in a 24 hour time period. Make sure to stay well hydrated by drinking plenty of water. Follow up with primary care provider in 1 to 2 days or as needed if no improvement or worsening of your symptoms.

## 2022-12-10 NOTE — PROGRESS NOTES
[address] [facility] A department of [facility] 99  Phone: [phone]  Fax: [phone]      Weston Pablo is a 25 y.o. female who presents to the [facility] or [address] [place] [address] today for her medical conditions/complaints as noted below. Susana Pablo is c/o of Congestion (Started three days ago. Son has influenza A, chest congestion, harder to breathe, wheezing at night, ear pain, burning/twisting feeling in stomach when eating)      HPI:     Coughing for couple weeks. Not getting better. Waxes and Wanes. 2 days ago started having vomiting and diarrhea. Her kids and several other family members have had similar symptoms. Past Medical History:   Past Medical History:   Diagnosis Date    Asthma     Bleeding disorder ([address]     Does not know the name, but says she does not clot. Past Surgical History:  has a past surgical history that includes Cholecystectomy and Tympanostomy tube placement. Allergies: Allergies   Allergen Reactions    Milk-Related Compounds Diarrhea     Milk Protein Intolernace         Social History:  reports that she has been smoking cigarettes. She has a 2.50 pack-year smoking history. She has never used smokeless tobacco. She reports current alcohol use. She reports current drug use. Frequency: 1.00 time per week. Drug: Marijuana Oswaldo Smith).      Wt Readings from Last 3 Encounters:   12/10/22 228 lb 8 oz (103.6 kg)   11/04/22 223 lb (101.2 kg)   10/27/22 225 lb 4.8 oz (102.2 kg)     BP Readings from Last 3 Encounters:   12/10/22 112/80   11/04/22 110/78   10/27/22 128/82      Temp Readings from Last 3 Encounters:   12/10/22 97.8 °F (36.6 °C)   11/04/22 97.7 °F (36.5 °C) (Tympanic)   10/27/22 98.2 °F (36.8 °C) (Oral)     Pulse Readings from Last 3 Encounters:   12/10/22 67   11/04/22 68   10/27/22 67     SpO2 Readings from Last 3 Encounters:   12/10/22 99%   11/04/22 98%   10/27/22 99%       Subjective:      Review of Systems   Constitutional:  Positive for appetite change. Negative for fever. HENT:  Positive for ear pain and postnasal drip. Negative for rhinorrhea and sore throat. Respiratory:  Positive for cough (yellow mucus at times.), shortness of breath and wheezing (at night usually after 10om). Gastrointestinal:  Positive for abdominal pain (feels like its on fire and twisting.), diarrhea, nausea and vomiting (6-7 times yesterday and was mainly water). Drinking a bottle of Gatorade at one time. Gulping liquids. Endocrine: Negative. Genitourinary: Negative. Skin: Negative. Neurological:  Positive for headaches (forehead). Psychiatric/Behavioral: Negative. All other systems reviewed and are negative. Objective:     Vitals:    12/10/22 1631   BP: 112/80   Site: Right Upper Arm   Position: Sitting   Cuff Size: Large Adult   Pulse: 67   Resp: 20   Temp: 97.8 °F (36.6 °C)   SpO2: 99%   Weight: 228 lb 8 oz (103.6 kg)   Height: 5' 4\" (1.626 m)     Body mass index is 39.22 kg/m². /80 (Site: Right Upper Arm, Position: Sitting, Cuff Size: Large Adult)   Pulse 67   Temp 97.8 °F (36.6 °C)   Resp 20   Ht 5' 4\" (1.626 m)   Wt 228 lb 8 oz (103.6 kg)   SpO2 99%   BMI 39.22 kg/m²   Physical Exam  Vitals and nursing note reviewed. Constitutional:       General: She is not in acute distress. Appearance: She is ill-appearing. HENT:      Right Ear: Hearing, tympanic membrane, ear canal and external ear normal. There is no impacted cerumen. Left Ear: Hearing, tympanic membrane, ear canal and external ear normal. There is no impacted cerumen. Nose: No mucosal edema, congestion or rhinorrhea. Right Turbinates: Swollen. Left Turbinates: Swollen. Right Sinus: Frontal sinus tenderness present. Left Sinus: Frontal sinus tenderness present. Mouth/Throat:      Lips: Pink. Mouth: Mucous membranes are moist.      Pharynx: Uvula midline.  Posterior oropharyngeal erythema present. Tonsils: No tonsillar exudate or tonsillar abscesses. 1+ on the right. 1+ on the left. Comments: Moderate amount of mucus noted in the pharynx. Eyes:      Conjunctiva/sclera: Conjunctivae normal.      Pupils: Pupils are equal, round, and reactive to light. Cardiovascular:      Rate and Rhythm: Normal rate and regular rhythm. Pulses: Normal pulses. Heart sounds: Normal heart sounds. Pulmonary:      Effort: Pulmonary effort is normal. No respiratory distress. Breath sounds: Normal breath sounds. No decreased air movement. No decreased breath sounds, wheezing, rhonchi or rales. Musculoskeletal:         General: Normal range of motion. Cervical back: Normal range of motion. Lymphadenopathy:      Cervical: No cervical adenopathy. Right cervical: No superficial or posterior cervical adenopathy. Left cervical: No superficial or posterior cervical adenopathy. Upper Body:      Right upper body: No supraclavicular adenopathy. Left upper body: No supraclavicular adenopathy. Skin:     General: Skin is warm and dry. Capillary Refill: Capillary refill takes less than 2 seconds. Neurological:      General: No focal deficit present. Mental Status: She is alert and oriented to person, place, and time. Mental status is at baseline. Psychiatric:         Mood and Affect: Mood normal.         Behavior: Behavior normal.       Assessment and Plan      Diagnosis Orders   1. Viral upper respiratory illness  fluticasone (FLONASE) 50 MCG/ACT nasal spray    loratadine (CLARITIN) 10 MG tablet      2. Viral gastroenteritis  ondansetron (ZOFRAN-ODT) 4 MG disintegrating tablet    loperamide (RA ANTI-DIARRHEAL) 2 MG capsule      3. Cough, unspecified type  POCT Influenza A/B      4. Diarrhea of presumed infectious origin  loperamide (RA ANTI-DIARRHEAL) 2 MG capsule      5.  Nausea and vomiting, unspecified vomiting type  ondansetron (ZOFRAN-ODT) 4 MG disintegrating tablet      6. Post-nasal drip  fluticasone (FLONASE) 50 MCG/ACT nasal spray    loratadine (CLARITIN) 10 MG tablet      7. Rhinorrhea  fluticasone (FLONASE) 50 MCG/ACT nasal spray    loratadine (CLARITIN) 10 MG tablet        Orders Placed This Encounter    POCT Influenza A/B    fluticasone (FLONASE) 50 MCG/ACT nasal spray     Si spray by Nasal route 2 times daily     Dispense:  16 g     Refill:  0    loratadine (CLARITIN) 10 MG tablet     Sig: Take 1 tablet by mouth daily     Dispense:  30 tablet     Refill:  0    ondansetron (ZOFRAN-ODT) 4 MG disintegrating tablet     Sig: Take 1 tablet by mouth 3 times daily as needed for Nausea or Vomiting     Dispense:  21 tablet     Refill:  0    loperamide (RA ANTI-DIARRHEAL) 2 MG capsule     Sig: Take 1 capsule by mouth 4 times daily as needed for Diarrhea     Dispense:  12 capsule     Refill:  0     The patient was encouraged to drink small but frequent amounts of water and slowly advance diet as tolerated. No spicy, acidic or citrus foods and drinks. They were also encouraged to drink an electrolyte replacement such as Gatorade, G2, Propel or Pedialyte to help avoid dehydration. Recommended over the counter medications/treatments: The use of an antihistamine (Claritin or Zyrtec) and a nasal steroid spray (Flonase or Nasacort) may help with sinus congestion and drainage. Mucinex will also help thin secretions and improve congestion. Works best if drinking plenty of water. Honey with or without Lemon may also help with coughing. Probiotics daily may help boost immune system. Alternating hot liquids with cold liquids helps to sooth sore throat  Use Acetaminophen (Tylenol) to help relieve fever, chills or body aches. If allowed, you may alternate using ibuprofen (Motrin) and Tylenol. Do not exceed 3,000mg of Tylenol in a 24 hour time period. Make sure to stay well hydrated by drinking plenty of water.     Follow up with primary care provider in 1 to 2 days or as needed if no improvement or worsening of your symptoms. Discussed exam, POCT findings, plan of care, and follow-up at length with patient. Reviewed all prescribed and recommended medications, administration and side effects. Encouraged patient to follow up with PCP or return to the clinic for no improvement and or worsening of symptoms. All questions were answered and they verbalized understanding and were agreeable with the plan. Follow up as needed.       Electronically signed by CHARISMA Frey CNP on 12/10/2022 at 5:08 PM

## 2022-12-10 NOTE — LETTER
[address] [facility] A department of [place] [address]  Phone: [phone]  Fax: [phone]    CHARISMA Umana CNP        December 10, 2022     Patient: Argenis Pablo   YOB: 2000   Date of Visit: 12/10/2022       To Whom It May Concern: It is my medical opinion that Susana Pablo may return to work on 12/12/2022. She must be free from vomiting and having diarrhea for at least 24 hours prior to returning to work. If you have any questions or concerns, please don't hesitate to call.     Sincerely,        CHARISMA Umana CNP

## 2025-08-21 ENCOUNTER — OFFICE VISIT (OUTPATIENT)
Dept: PRIMARY CARE CLINIC | Age: 25
End: 2025-08-21

## 2025-08-21 VITALS
DIASTOLIC BLOOD PRESSURE: 82 MMHG | WEIGHT: 143.4 LBS | OXYGEN SATURATION: 97 % | BODY MASS INDEX: 24.48 KG/M2 | HEART RATE: 53 BPM | RESPIRATION RATE: 23 BRPM | TEMPERATURE: 97.7 F | HEIGHT: 64 IN | SYSTOLIC BLOOD PRESSURE: 133 MMHG

## 2025-08-21 DIAGNOSIS — R19.7 DIARRHEA, UNSPECIFIED TYPE: ICD-10-CM

## 2025-08-21 DIAGNOSIS — R11.2 NAUSEA AND VOMITING, UNSPECIFIED VOMITING TYPE: Primary | ICD-10-CM

## 2025-08-21 PROCEDURE — PBSHW PBB SHADOW CHARGE

## 2025-08-21 PROCEDURE — 99212 OFFICE O/P EST SF 10 MIN: CPT

## 2025-08-21 PROCEDURE — 99213 OFFICE O/P EST LOW 20 MIN: CPT

## 2025-08-21 RX ORDER — ONDANSETRON 4 MG/1
4 TABLET, ORALLY DISINTEGRATING ORAL 3 TIMES DAILY PRN
Qty: 21 TABLET | Refills: 0 | Status: SHIPPED | OUTPATIENT
Start: 2025-08-21

## 2025-08-21 RX ORDER — ONDANSETRON 2 MG/ML
4 INJECTION INTRAMUSCULAR; INTRAVENOUS ONCE
Status: COMPLETED | OUTPATIENT
Start: 2025-08-21 | End: 2025-08-21

## 2025-08-21 RX ORDER — METOCLOPRAMIDE 5 MG/1
5 TABLET ORAL
COMMUNITY
Start: 2025-05-29

## 2025-08-21 RX ORDER — TIZANIDINE 2 MG/1
2 TABLET ORAL EVERY 8 HOURS PRN
COMMUNITY
Start: 2025-05-15

## 2025-08-21 RX ADMIN — ONDANSETRON HYDROCHLORIDE 4 MG: 2 SOLUTION INTRAMUSCULAR; INTRAVENOUS at 16:58

## 2025-08-21 SDOH — ECONOMIC STABILITY: FOOD INSECURITY: WITHIN THE PAST 12 MONTHS, THE FOOD YOU BOUGHT JUST DIDN'T LAST AND YOU DIDN'T HAVE MONEY TO GET MORE.: NEVER TRUE

## 2025-08-21 SDOH — ECONOMIC STABILITY: FOOD INSECURITY: WITHIN THE PAST 12 MONTHS, YOU WORRIED THAT YOUR FOOD WOULD RUN OUT BEFORE YOU GOT MONEY TO BUY MORE.: NEVER TRUE

## 2025-08-21 ASSESSMENT — ENCOUNTER SYMPTOMS
NAUSEA: 1
VOMITING: 1
DIARRHEA: 1
COLOR CHANGE: 0
ABDOMINAL PAIN: 1
COUGH: 0
SHORTNESS OF BREATH: 0
BLOOD IN STOOL: 0
CONSTIPATION: 0

## 2025-08-21 ASSESSMENT — PATIENT HEALTH QUESTIONNAIRE - PHQ9: DEPRESSION UNABLE TO ASSESS: PT REFUSES
